# Patient Record
Sex: MALE | Race: WHITE | NOT HISPANIC OR LATINO | Employment: OTHER | URBAN - METROPOLITAN AREA
[De-identification: names, ages, dates, MRNs, and addresses within clinical notes are randomized per-mention and may not be internally consistent; named-entity substitution may affect disease eponyms.]

---

## 2021-06-28 ENCOUNTER — OFFICE VISIT (OUTPATIENT)
Dept: AUDIOLOGY | Facility: CLINIC | Age: 76
End: 2021-06-28
Payer: COMMERCIAL

## 2021-06-28 DIAGNOSIS — R42 DIZZINESS: Primary | ICD-10-CM

## 2021-06-28 PROCEDURE — 92567 TYMPANOMETRY: CPT | Performed by: AUDIOLOGIST

## 2021-06-28 PROCEDURE — 92540 BASIC VESTIBULAR EVALUATION: CPT | Performed by: AUDIOLOGIST

## 2021-06-28 PROCEDURE — 92537 CALORIC VSTBLR TEST W/REC: CPT | Performed by: AUDIOLOGIST

## 2021-06-28 NOTE — PROGRESS NOTES
Videonystagmography (VNG) Evaluation    Name:  Sol Chapa  :  1945  Age:  66 y o  Date of Evaluation: 21     History: Dizziness  Reason for visit: Sol Chapa is seen today at the request of Dr RIVERA [facility] for an evaluation of balance  Patient reports a history of dizziness that initially began at the end of   Patient reports all of a sudden he started to experience daily lightheadedness that lasts from morning to night  Patient also reports daily headaches; denies family history of migraines  Patient reports in 2021 he experienced true spinning vertigo where he followed up with ENT and completed a round of vestibular therapy with successful recovery  Patient denies any vertigo since  Patient also reports in 2021 having a fall where he landed on his face; unsure if he passed out  Patient denies any presyncope  Patient does admit to several heart problems for which he was hospitalized for 2x  Patient reports this lightheadedness occurs primarily when moving, getting out of the car, and in relation to some motions; denies it occurring when he is still, sitting, or lying  Patient denies any head injuries, viral infections, changes in hearing, tinnitus, aural fullness, or sound sensitivity  Tympanometry:   Right: Type A - normal middle ear pressure and compliance   Left: Type A - normal middle ear pressure and compliance    Oculomotor battery:    Gaze:          Right: Normal        Left: Normal         Up: Normal        Down: Normal      Saccades: Normal     Tracking: Normal     Optokinetic: Normal    Positioning/Positionals:     Patsi Sanna:    Right: Negative      Left: Negative        Positionals:     Sitting: Normal    Supine: Normal    Head Right: Normal    Head Left: Normal    Body Right: Normal    Body Left[de-identified] Normal      Calorics:     Bithermal Caloric Irrigation: Normal    Notes:  Normal central findings  Normal peripheral findings       Recommendations:  Continue medical work-up  Follow up with managing physician        Yenny Blum , CHILO-A  Clinical Audiologist

## 2022-05-23 ENCOUNTER — APPOINTMENT (OUTPATIENT)
Dept: RADIOLOGY | Facility: CLINIC | Age: 77
End: 2022-05-23
Payer: COMMERCIAL

## 2022-05-23 VITALS
SYSTOLIC BLOOD PRESSURE: 146 MMHG | DIASTOLIC BLOOD PRESSURE: 65 MMHG | WEIGHT: 74 LBS | HEIGHT: 67 IN | HEART RATE: 54 BPM | BODY MASS INDEX: 11.62 KG/M2

## 2022-05-23 DIAGNOSIS — M25.512 LEFT SHOULDER PAIN, UNSPECIFIED CHRONICITY: ICD-10-CM

## 2022-05-23 DIAGNOSIS — M24.812 INTERNAL DERANGEMENT OF LEFT SHOULDER: Primary | ICD-10-CM

## 2022-05-23 DIAGNOSIS — M19.012 PRIMARY OSTEOARTHRITIS OF LEFT SHOULDER: ICD-10-CM

## 2022-05-23 PROCEDURE — 73030 X-RAY EXAM OF SHOULDER: CPT

## 2022-05-23 PROCEDURE — 99204 OFFICE O/P NEW MOD 45 MIN: CPT | Performed by: ORTHOPAEDIC SURGERY

## 2022-05-23 RX ORDER — PANTOPRAZOLE SODIUM 40 MG/1
TABLET, DELAYED RELEASE ORAL
COMMUNITY
Start: 2022-04-28

## 2022-05-23 RX ORDER — METOPROLOL SUCCINATE 25 MG/1
TABLET, EXTENDED RELEASE ORAL
COMMUNITY
Start: 2022-03-10

## 2022-05-23 RX ORDER — ATORVASTATIN CALCIUM 40 MG/1
TABLET, FILM COATED ORAL
COMMUNITY
Start: 2022-04-28

## 2022-05-23 RX ORDER — LATANOPROST 50 UG/ML
SOLUTION/ DROPS OPHTHALMIC
COMMUNITY
Start: 2022-03-17

## 2022-05-23 RX ORDER — AMLODIPINE BESYLATE 5 MG/1
10 TABLET ORAL
COMMUNITY
Start: 2022-04-28

## 2022-05-23 RX ORDER — ACETAMINOPHEN AND CODEINE PHOSPHATE 300; 60 MG/1; MG/1
TABLET ORAL AS NEEDED
COMMUNITY
Start: 2022-04-05

## 2022-05-23 RX ORDER — AZELASTINE HYDROCHLORIDE 137 UG/1
SPRAY, METERED NASAL
COMMUNITY
Start: 2022-05-17

## 2022-05-23 RX ORDER — APIXABAN 5 MG/1
5 TABLET, FILM COATED ORAL 2 TIMES DAILY
COMMUNITY
Start: 2022-04-28

## 2022-05-23 RX ORDER — OXYBUTYNIN CHLORIDE 10 MG/1
10 TABLET, EXTENDED RELEASE ORAL 2 TIMES DAILY
COMMUNITY
Start: 2022-03-06

## 2022-05-23 NOTE — PROGRESS NOTES
Assessment/Plan:  1  Internal derangement of left shoulder  MRI shoulder left wo contrast   2  Primary osteoarthritis of left shoulder     3  Left shoulder pain, unspecified chronicity  XR shoulder 2+ vw left       Scribe Attestation    I,:  Clover Underwood am acting as a scribe while in the presence of the attending physician :       I,:  Kojo Booht MD personally performed the services described in this documentation    as scribed in my presence :           Upon evaluation and review of the left shoulder xrays taken today, Kiara Downs is presenting with signs and symptoms of severe left shoulder glenohumeral osteoarthritis  I discussed with Kiara Downs that at this time I would like to order an MRI of the left shoulder to question rotator cuff tear arthropathy  I discussed with Kiara Downs that depending on the results of the MRI, he could be a candidate for operative intervention in the form of a left shoulder reverse total shoulder arthroplasty  I discussed with Kiara Downs that due to his continue symptoms after treatment with his steroid injections and physical therapy, I believe that he should consider a total shoulder arthroplasty  Kiara Downs was agreeable to this and I provided him with an order for a left shoulder MRI today  I will follow up with Kiara Downs again upon completion of the left shoulder MRI for repeat evaluation, MRI review, and discussion concerning operative intervention  Subjective:   Rachel Menendez is a 64 y o  male who presents to the office today for initial evaluation of his left shoulder  Kiara Downs is referred to me today by his PCP, Dr Diallo Downs states that he has been experiencing pain about his left shoulder for several years  He has been previously treated concerning severe left glenohumeral osteoarthritis with about 4 steroid injections and formal physical therapy  He states that both injections and physical therapy have not improved his condition      He states that his left shoulder function has continued to decrease over time and he has limitations with reaching overhead  Kendal Larsen states that he has difficulty with sleeping at night due to his pain and often is woken up by his pain  He denies and distal paresthesias about the left upper extremity  Kendal Larsen does not recall any previous injury to his left shoulder or any previous surgeries about his left shoulder  Review of Systems   Constitutional: Negative for chills and fever  HENT: Negative for ear pain and sore throat  Eyes: Negative for pain and visual disturbance  Respiratory: Negative for cough and shortness of breath  Cardiovascular: Negative for chest pain and palpitations  Gastrointestinal: Negative for abdominal pain and vomiting  Genitourinary: Negative for dysuria and hematuria  Musculoskeletal: Negative for back pain  Skin: Negative for color change and rash  Neurological: Negative for seizures and syncope  All other systems reviewed and are negative          Past Medical History:   Diagnosis Date    Hypercholesterolemia     Hypertension        Past Surgical History:   Procedure Laterality Date    CORONARY STENT PLACEMENT      x 2    SPINAL FUSION      L2, L3 and L4    SPINAL STIMULATOR PLACEMENT  09/2021       Family History   Problem Relation Age of Onset    No Known Problems Mother        Social History     Occupational History    Not on file   Tobacco Use    Smoking status: Former Smoker     Types: Cigarettes    Smokeless tobacco: Never Used   Substance and Sexual Activity    Alcohol use: Not Currently    Drug use: Yes     Types: Marijuana     Comment: Medical Marijuana    Sexual activity: Not on file         Current Outpatient Medications:     acetaminophen-codeine (TYLENOL with CODEINE #4) 300-60 MG per tablet, as needed, Disp: , Rfl:     amLODIPine (NORVASC) 5 mg tablet, , Disp: , Rfl:     atorvastatin (LIPITOR) 40 mg tablet, , Disp: , Rfl:     Azelastine HCl 137 MCG/SPRAY SOLN, , Disp: , Rfl:     Eliquis 5 MG, 5 mg 2 (two) times a day, Disp: , Rfl:     latanoprost (XALATAN) 0 005 % ophthalmic solution, , Disp: , Rfl:     metoprolol succinate (TOPROL-XL) 25 mg 24 hr tablet, , Disp: , Rfl:     oxybutynin (DITROPAN-XL) 10 MG 24 hr tablet, 10 mg  in the morning and 10 mg before bedtime  , Disp: , Rfl:     pantoprazole (PROTONIX) 40 mg tablet, , Disp: , Rfl:     No Known Allergies    Objective:  Vitals:    05/23/22 1045   BP: 146/65   Pulse: (!) 54       Left Shoulder Exam     Range of Motion   Active abduction: 90   External rotation: 0   Forward flexion: 90   Internal rotation 90 degrees: 0     Muscle Strength   Left shoulder normal muscle strength: Abduction: 4-/5  Internal rotation: 5/5   External rotation: 5/5     Tests   Impingement: positive    Other   Erythema: absent  Scars: absent  Sensation: normal     Comments:  Severe crepitus with motion  Physical Exam  HENT:      Head: Normocephalic and atraumatic  Eyes:      General:         Right eye: No discharge  Left eye: No discharge  Extraocular Movements: Extraocular movements intact  Conjunctiva/sclera: Conjunctivae normal    Cardiovascular:      Rate and Rhythm: Normal rate  Pulmonary:      Effort: Pulmonary effort is normal  No respiratory distress  Musculoskeletal:      Cervical back: Normal range of motion and neck supple  Skin:     General: Skin is warm and dry  Neurological:      Mental Status: He is alert and oriented to person, place, and time  Psychiatric:         Mood and Affect: Mood normal          Behavior: Behavior normal          I have personally reviewed pertinent films in PACS and my interpretation is as follows:    Review of the left shoulder xrays taken today demonstrate severe glenohumeral osteoarthritis evidenced by joint space narrowing and osteophyte formation  There is also a superiorly migrated humerus suggestive of rotator cuff arthropathy

## 2022-05-27 DIAGNOSIS — R91.8 MASS OF LEFT LUNG: ICD-10-CM

## 2022-05-27 DIAGNOSIS — R91.1 LUNG NODULE SEEN ON IMAGING STUDY: Primary | ICD-10-CM

## 2022-05-27 DIAGNOSIS — J98.4 CAVITATING MASS IN RIGHT LOWER LUNG LOBE: Primary | ICD-10-CM

## 2022-05-27 NOTE — RESULT ENCOUNTER NOTE
I did leave a voicemail message for Hai asking him to call us back  He did have a questionable finding in the left lung field on his shoulder x-ray  The radiologist suggests that we have him receive a CT of his chest to further characterize the focal density seen in the left lung apex  I asked him to call us back here in the office    I will put in an order for the CT of the chest

## 2022-05-28 NOTE — TELEPHONE ENCOUNTER
Patient sees Dr Iam Gaspar  Patient is calling in stating that he was left a message from the Dr that something came up on the Xray and the Dr ordered a CT scan for him to have done  The patient is asking for the Dr to contact him back to discuss further          Call back# [phone]

## 2022-05-31 ENCOUNTER — TELEPHONE (OUTPATIENT)
Dept: OBGYN CLINIC | Facility: CLINIC | Age: 77
End: 2022-05-31

## 2022-05-31 NOTE — TELEPHONE ENCOUNTER
----- Message from Radha Colvin MD sent at 5/27/2022  5:27 PM EDT -----  I did leave a voicemail message for Maribel Graf asking him to call us back  He did have a questionable finding in the left lung field on his shoulder x-ray  The radiologist suggests that we have him receive a CT of his chest to further characterize the focal density seen in the left lung apex  I asked him to call us back here in the office    I will put in an order for the CT of the chest

## 2022-05-31 NOTE — TELEPHONE ENCOUNTER
Patient called, he was left a voicemail regarding his xray and stated he was on the phone with you, but it was disconnected and would like a call back    # [phone]

## 2022-05-31 NOTE — TELEPHONE ENCOUNTER
CT Scan    Montserrat Wilson routed conversation to Tedi Prader, MD; Priscilla Dyer, RN; Andre Arechiga Clinical 1 hour ago (7:06 AM)     Anastacia Poole 3 days ago     [facility]       Patient sees Dr Lena Nichols  Patient is calling in stating that he was left a message from the Dr that something came up on the Xray and the Dr ordered a CT scan for him to have done  The patient is asking for the Dr to contact him back to discuss further             Call back# [phone]         Documentation

## 2022-05-31 NOTE — TELEPHONE ENCOUNTER
I did call him and left a detailed message  Can we please help him schedule the CT scan of the chest which I ordered as a follow-up to the chest x-ray which demonstrated an abnormality in the lung field adjacent to the shoulder     Thank you

## 2022-06-02 ENCOUNTER — HOSPITAL ENCOUNTER (OUTPATIENT)
Dept: RADIOLOGY | Facility: HOSPITAL | Age: 77
Discharge: HOME | End: 2022-06-02
Attending: ORTHOPAEDIC SURGERY
Payer: COMMERCIAL

## 2022-06-02 DIAGNOSIS — R91.1 LUNG NODULE SEEN ON IMAGING STUDY: ICD-10-CM

## 2022-06-02 PROCEDURE — 71250 CT THORAX DX C-: CPT

## 2022-06-02 PROCEDURE — G1004 CDSM NDSC: HCPCS

## 2022-06-07 ENCOUNTER — TELEPHONE (OUTPATIENT)
Dept: OBGYN CLINIC | Facility: CLINIC | Age: 77
End: 2022-06-07

## 2022-06-07 NOTE — RESULT ENCOUNTER NOTE
I called Isatu Elias to let him know that his CT of his chest was normal in the left lung field  There was a small, incidental finding of a lung nodule in the right lung field measuring 2 mm  He is not a smoker  He is not high risk, thus, another CT in one year is not warranted  He was appreciative of the call and will f/u with me after his shoulder MRI

## 2022-06-07 NOTE — TELEPHONE ENCOUNTER
Patient called asking for the results of his CT on 6/02  Zaina Johnsons it is okay to leave message as he is going into an operation today     Note that the note on the CT reads as following:  Abnormal finding in the left lung field on shoulder x-ray   Requested by the radiologist ; Abnormal finding in the left lung field on shoulder x-ray   Requested by the radiologist    Dx: Lung nodule seen on imaging study [R91 1 (ICD-10-CM)]

## 2022-06-09 ENCOUNTER — TELEPHONE (OUTPATIENT)
Dept: OBGYN CLINIC | Facility: CLINIC | Age: 77
End: 2022-06-09

## 2022-06-09 NOTE — TELEPHONE ENCOUNTER
----- Message from Maurice Vega MD sent at 6/7/2022 12:44 PM EDT -----  I called Ryley Milner to let him know that his CT of his chest was normal in the left lung field  There was a small, incidental finding of a lung nodule in the right lung field measuring 2 mm  He is not a smoker  He is not high risk, thus, another CT in one year is not warranted  He was appreciative of the call and will f/u with me after his shoulder MRI

## 2022-06-09 NOTE — TELEPHONE ENCOUNTER
Patient MRI scheduled for 6/13/2022 Dr Francisco Javier Braga is very booked out so I wanted to get him in so he did not have to wait longer  I got patient in on 6/22/22 to go over MRI

## 2022-06-13 ENCOUNTER — HOSPITAL ENCOUNTER (OUTPATIENT)
Dept: RADIOLOGY | Facility: HOSPITAL | Age: 77
Discharge: HOME | End: 2022-06-13
Attending: ORTHOPAEDIC SURGERY
Payer: COMMERCIAL

## 2022-06-13 ENCOUNTER — HOSPITAL ENCOUNTER (OUTPATIENT)
Dept: RADIOLOGY | Facility: HOSPITAL | Age: 77
Discharge: HOME | End: 2022-06-13

## 2022-06-13 DIAGNOSIS — M24.812 INTERNAL DERANGEMENT OF LEFT SHOULDER: ICD-10-CM

## 2022-06-13 DIAGNOSIS — M79.5 FOREIGN BODY (FB) IN SOFT TISSUE: ICD-10-CM

## 2022-06-13 PROCEDURE — G1004 CDSM NDSC: HCPCS

## 2022-06-13 PROCEDURE — 73221 MRI JOINT UPR EXTREM W/O DYE: CPT

## 2022-06-22 ENCOUNTER — OFFICE VISIT (OUTPATIENT)
Dept: OBGYN CLINIC | Facility: CLINIC | Age: 77
End: 2022-06-22
Payer: COMMERCIAL

## 2022-06-22 VITALS
WEIGHT: 171.4 LBS | DIASTOLIC BLOOD PRESSURE: 67 MMHG | HEART RATE: 60 BPM | BODY MASS INDEX: 26.9 KG/M2 | HEIGHT: 67 IN | SYSTOLIC BLOOD PRESSURE: 175 MMHG

## 2022-06-22 DIAGNOSIS — Z01.812 PRE-OPERATIVE LABORATORY EXAMINATION: ICD-10-CM

## 2022-06-22 DIAGNOSIS — M19.012 PRIMARY OSTEOARTHRITIS OF LEFT SHOULDER: Primary | ICD-10-CM

## 2022-06-22 PROCEDURE — 99215 OFFICE O/P EST HI 40 MIN: CPT | Performed by: ORTHOPAEDIC SURGERY

## 2022-06-22 NOTE — PROGRESS NOTES
Assessment/Plan:  1  Primary osteoarthritis of left shoulder  CT upper extremity wo contrast left    Ambulatory Referral to Physical Therapy    Sling    Case request operating room: ARTHROPLASTY SHOULDER    Basic metabolic panel    CBC and differential    Type and screen    APTT    Protime-INR    Ambulatory referral to [facility]    Case request operating room: ARTHROPLASTY SHOULDER    MRSA culture   2  Pre-operative laboratory examination  Case request operating room: ARTHROPLASTY SHOULDER    Basic metabolic panel    CBC and differential    Type and screen    APTT    Protime-INR    Ambulatory referral to [facility]    Case request operating room: ARTHROPLASTY SHOULDER    MRSA culture       Scribe Attestation    I,:  Yazmin Whitten am acting as a scribe while in the presence of the attending physician :       I,:  Kita Vides MD personally performed the services described in this documentation    as scribed in my presence :         Adriana Vinson upon examination and review the MRI of the left shoulder does demonstrate intact rotator cuff  He does have severe glenohumeral joint osteoarthritis with large osteophytes inferiorly  This does coincide with his clinical exam with limitations in range of motion in all planes as well as significant crepitus  I did discuss with him today that he would be a candidate for a left standard total shoulder arthroplasty  I did discuss the procedure with him at length as well as the risks including but not limited to bleeding, infection, nerve injury resulting weakness, numbness, pain, stiffness, fracture, worsening of symptoms, dislocation, prosthesis rejection, blood clot, failure of surgery and need for further surgery  He understands that this is a major surgery  Adriana Vinson verbalize understanding and was amenable to the treatment plan presented to him today  He did provide signed consent for a left standard total shoulder arthroplasty    He will be fitted with a postoperative sling today by my DME fitter  Proper donning and doffing will be also instructed to him  He will meet with my surgical scheduler to set up a date and time have the surgery completed  I will see him back on date of his surgery  Subjective:   Katelin Calix is a 64 y o  male who presents to the office today for follow-up evaluation of his left shoulder  He has been experiencing activity related pain for many years to her shoulder  He does also have limitations in range of motion planes secondary a pain and weakness  He does also experience significant crepitus with overhead and reaching activities  He describes his pain as an intermittent and moderate sometimes click better while at rest   However, does have difficulty sleeping on his shoulder at night  Today he denies any distal paresthesias  He did have an MRI of his left shoulder completed to be reviewed today  Review of Systems   Constitutional: Negative for chills, fever and unexpected weight change  HENT: Negative for hearing loss, nosebleeds and sore throat  Eyes: Negative for pain, redness and visual disturbance  Respiratory: Negative for cough, shortness of breath and wheezing  Cardiovascular: Negative for chest pain, palpitations and leg swelling  Gastrointestinal: Negative for abdominal pain, nausea and vomiting  Endocrine: Negative for polyphagia and polyuria  Genitourinary: Negative for dysuria and hematuria  Musculoskeletal: Positive for arthralgias and myalgias  See HPI   Skin: Negative for rash and wound  Neurological: Negative for dizziness, numbness and headaches  Psychiatric/Behavioral: Negative for decreased concentration and suicidal ideas  The patient is not nervous/anxious            Past Medical History:   Diagnosis Date    Hypercholesterolemia     Hypertension        Past Surgical History:   Procedure Laterality Date    CORONARY STENT PLACEMENT      x 2    SPINAL FUSION L2, L3 and L4    SPINAL STIMULATOR PLACEMENT  09/2021       Family History   Problem Relation Age of Onset    No Known Problems Mother        Social History     Occupational History    Not on file   Tobacco Use    Smoking status: Former Smoker     Types: Cigarettes    Smokeless tobacco: Never Used   Substance and Sexual Activity    Alcohol use: Not Currently    Drug use: Yes     Types: Marijuana     Comment: Medical Marijuana    Sexual activity: Not on file         Current Outpatient Medications:     amLODIPine (NORVASC) 5 mg tablet, 10 mg 10mg once daily, Disp: , Rfl:     acetaminophen-codeine (TYLENOL with CODEINE #4) 300-60 MG per tablet, as needed, Disp: , Rfl:     atorvastatin (LIPITOR) 40 mg tablet, , Disp: , Rfl:     Azelastine HCl 137 MCG/SPRAY SOLN, , Disp: , Rfl:     Eliquis 5 MG, 5 mg 2 (two) times a day, Disp: , Rfl:     latanoprost (XALATAN) 0 005 % ophthalmic solution, , Disp: , Rfl:     metoprolol succinate (TOPROL-XL) 25 mg 24 hr tablet, , Disp: , Rfl:     oxybutynin (DITROPAN-XL) 10 MG 24 hr tablet, 10 mg  in the morning and 10 mg before bedtime  , Disp: , Rfl:     pantoprazole (PROTONIX) 40 mg tablet, , Disp: , Rfl:     No Known Allergies    Objective:  Vitals:    06/22/22 0923   BP: (!) 175/67   Pulse: 60       Left Shoulder Exam     Tenderness   Left shoulder tenderness location: anterior  Range of Motion   Active abduction: 90   External rotation: 10   Internal rotation 90 degrees: 20     Muscle Strength   Abduction: 4/5   Internal rotation: 5/5   External rotation: 5/5             Physical Exam  Vitals reviewed  HENT:      Head: Normocephalic and atraumatic  Eyes:      General:         Right eye: No discharge  Left eye: No discharge  Conjunctiva/sclera: Conjunctivae normal       Pupils: Pupils are equal, round, and reactive to light  Cardiovascular:      Rate and Rhythm: Normal rate  Heart sounds: Normal heart sounds     Pulmonary:      Effort: Pulmonary effort is normal  No respiratory distress  Breath sounds: Normal breath sounds  Musculoskeletal:      Cervical back: Normal range of motion and neck supple  Comments: As noted in HPI   Skin:     General: Skin is warm and dry  Neurological:      Mental Status: He is alert and oriented to person, place, and time  I have personally reviewed pertinent films in PACS and my interpretation is as follows:    MRI of the left shoulder demonstrates an intact rotator cuff with mild fatty atrophy of supraspinatus  Severe glenohumeral joint osteoarthritis is demonstrated

## 2022-07-01 ENCOUNTER — HOSPITAL ENCOUNTER (OUTPATIENT)
Dept: RADIOLOGY | Facility: HOSPITAL | Age: 77
Discharge: HOME | End: 2022-07-01
Attending: ORTHOPAEDIC SURGERY
Payer: COMMERCIAL

## 2022-07-01 ENCOUNTER — APPOINTMENT (OUTPATIENT)
Dept: LAB | Facility: HOSPITAL | Age: 77
End: 2022-07-01
Payer: COMMERCIAL

## 2022-07-01 DIAGNOSIS — Z01.812 PRE-OPERATIVE LABORATORY EXAMINATION: ICD-10-CM

## 2022-07-01 DIAGNOSIS — M19.012 PRIMARY OSTEOARTHRITIS OF LEFT SHOULDER: ICD-10-CM

## 2022-07-01 LAB
ABO GROUP BLD: NORMAL
ANION GAP SERPL CALCULATED.3IONS-SCNC: 10 MMOL/L (ref 4–13)
APTT PPP: 34 SECONDS (ref 23–37)
ATRIAL RATE: 55 BPM
BASOPHILS # BLD AUTO: 0.04 THOUSANDS/ΜL (ref 0–0.1)
BASOPHILS NFR BLD AUTO: 1 % (ref 0–1)
BLD GP AB SCN SERPL QL: NEGATIVE
BUN SERPL-MCNC: 15 MG/DL (ref 5–25)
CALCIUM SERPL-MCNC: 9.7 MG/DL (ref 8.3–10.1)
CHLORIDE SERPL-SCNC: 103 MMOL/L (ref 100–108)
CO2 SERPL-SCNC: 27 MMOL/L (ref 21–32)
CREAT SERPL-MCNC: 0.74 MG/DL (ref 0.6–1.3)
EOSINOPHIL # BLD AUTO: 0.15 THOUSAND/ΜL (ref 0–0.61)
EOSINOPHIL NFR BLD AUTO: 2 % (ref 0–6)
ERYTHROCYTE [DISTWIDTH] IN BLOOD BY AUTOMATED COUNT: 14.4 % (ref 11.6–15.1)
GFR SERPL CREATININE-BSD FRML MDRD: 88 ML/MIN/1.73SQ M
GLUCOSE P FAST SERPL-MCNC: 94 MG/DL (ref 65–99)
HCT VFR BLD AUTO: 38.2 % (ref 36.5–49.3)
HGB BLD-MCNC: 12.3 G/DL (ref 12–17)
IMM GRANULOCYTES # BLD AUTO: 0.02 THOUSAND/UL (ref 0–0.2)
IMM GRANULOCYTES NFR BLD AUTO: 0 % (ref 0–2)
INR PPP: 1.14 (ref 0.84–1.19)
LYMPHOCYTES # BLD AUTO: 1.65 THOUSANDS/ΜL (ref 0.6–4.47)
LYMPHOCYTES NFR BLD AUTO: 22 % (ref 14–44)
MCH RBC QN AUTO: 29 PG (ref 26.8–34.3)
MCHC RBC AUTO-ENTMCNC: 32.2 G/DL (ref 31.4–37.4)
MCV RBC AUTO: 90 FL (ref 82–98)
MONOCYTES # BLD AUTO: 0.74 THOUSAND/ΜL (ref 0.17–1.22)
MONOCYTES NFR BLD AUTO: 10 % (ref 4–12)
NEUTROPHILS # BLD AUTO: 4.81 THOUSANDS/ΜL (ref 1.85–7.62)
NEUTS SEG NFR BLD AUTO: 65 % (ref 43–75)
NRBC BLD AUTO-RTO: 0 /100 WBCS
P AXIS: -17 DEGREES
PLATELET # BLD AUTO: 231 THOUSANDS/UL (ref 149–390)
PMV BLD AUTO: 10.4 FL (ref 8.9–12.7)
POTASSIUM SERPL-SCNC: 3.9 MMOL/L (ref 3.5–5.3)
PR INTERVAL: 198 MS
PROTHROMBIN TIME: 14.4 SECONDS (ref 11.6–14.5)
QRS AXIS: 94 DEGREES
QRSD INTERVAL: 92 MS
QT INTERVAL: 438 MS
QTC INTERVAL: 455 MS
RBC # BLD AUTO: 4.24 MILLION/UL (ref 3.88–5.62)
RH BLD: POSITIVE
SODIUM SERPL-SCNC: 140 MMOL/L (ref 136–145)
SPECIMEN EXPIRATION DATE: NORMAL
T WAVE AXIS: -3 DEGREES
VENTRICULAR RATE: 65 BPM
WBC # BLD AUTO: 7.41 THOUSAND/UL (ref 4.31–10.16)

## 2022-07-01 PROCEDURE — 85610 PROTHROMBIN TIME: CPT

## 2022-07-01 PROCEDURE — 86901 BLOOD TYPING SEROLOGIC RH(D): CPT

## 2022-07-01 PROCEDURE — 86900 BLOOD TYPING SEROLOGIC ABO: CPT

## 2022-07-01 PROCEDURE — 86850 RBC ANTIBODY SCREEN: CPT

## 2022-07-01 PROCEDURE — 73200 CT UPPER EXTREMITY W/O DYE: CPT

## 2022-07-01 PROCEDURE — 93010 ELECTROCARDIOGRAM REPORT: CPT | Performed by: INTERNAL MEDICINE

## 2022-07-01 PROCEDURE — 36415 COLL VENOUS BLD VENIPUNCTURE: CPT

## 2022-07-01 PROCEDURE — 85025 COMPLETE CBC W/AUTO DIFF WBC: CPT

## 2022-07-01 PROCEDURE — 85730 THROMBOPLASTIN TIME PARTIAL: CPT

## 2022-07-01 PROCEDURE — 87081 CULTURE SCREEN ONLY: CPT

## 2022-07-01 PROCEDURE — G1004 CDSM NDSC: HCPCS

## 2022-07-01 PROCEDURE — 80048 BASIC METABOLIC PNL TOTAL CA: CPT

## 2022-07-01 PROCEDURE — 93005 ELECTROCARDIOGRAM TRACING: CPT

## 2022-07-02 LAB — MRSA NOSE QL CULT: NORMAL

## 2022-07-12 ENCOUNTER — TELEPHONE (OUTPATIENT)
Dept: OBGYN CLINIC | Facility: CLINIC | Age: 77
End: 2022-07-12

## 2022-07-12 NOTE — TELEPHONE ENCOUNTER
Received call informing us of Intent to Deny left shoulder surgery (cpt 46313) Inpatient Stay due to insufficient documentation of supervised physical therapy  Stated a p2p can be completed by the doctor before 7/13 @ 1 pm CST  Contact# [phone] Opt 1  I've reached out to the p2p line asking if there is a way to fax additional documentation vs doing a p2p  I was provided a fax# [phone]  Was told to reference number [id number]  I have faxed over the home exercises that were provided by ([facility]) back in 2016 showing that he did perform exercises to no avail  I also reached out to Bhaskar to inquire if he has in fact done recent O/P PT (within the last year)  He has confirmed that he has not done any O/P PT for the shoulder, however, he has done PT for his lumbar spine

## 2022-07-12 NOTE — TELEPHONE ENCOUNTER
With the additional clinical faxed over regarding physical therapy exercises, Inpatient Surgery has been authorized  Patient has been notified

## 2022-08-11 RX ORDER — ASPIRIN 81 MG/1
81 TABLET ORAL DAILY
COMMUNITY

## 2022-08-11 RX ORDER — MULTIVIT-MINS NO.63/IRON/FOLIC 27 MG-1 MG
1 TABLET ORAL DAILY
COMMUNITY

## 2022-08-11 NOTE — PRE-PROCEDURE INSTRUCTIONS
My Surgical Experience    The following information was developed to assist you to prepare for your operation  What do I need to do before coming to the hospital?   Arrange for a responsible person to drive you to and from the hospital    Arrange care for your children at home  Children are not allowed in the recovery areas of the hospital   Plan to wear clothing that is easy to put on and take off  If you are having shoulder surgery, wear a shirt that buttons or zippers in the front  Bathing  o Shower the evening before and the morning of your surgery with an antibacterial soap  Please refer to the Pre Op Showering Instructions for Surgery Patients Sheet   o Remove nail polish and all body piercing jewelry  o Do not shave any body part for at least 24 hours before surgery-this includes face, arms, legs and upper body  Food  o Nothing to eat or drink after midnight the night before your surgery  This includes candy and chewing gum  o Exception: If your surgery is after 12:00pm (noon), you may have clear liquids such as 7-Up®, ginger ale, apple or cranberry juice, Jell-O®, water, or clear broth until 8:00 am  o Do not drink milk or juice with pulp on the morning before surgery  o Do not drink alcohol 24 hours before surgery  Medicine  o Follow instructions you received from your surgeon about which medicines you may take on the day of surgery  o If instructed to take medicine on the morning of surgery, take pills with just a small sip of water  Call your prescribing doctor for specific infroamtion on what to do if you take insulin    What should I bring to the hospital?    Bring:  Silas Nims or a walker, if you have them, for foot or knee surgery   A list of the daily medicines, vitamins, minerals, herbals and nutritional supplements you take   Include the dosages of medicines and the time you take them each day   Glasses, dentures or hearing aids   Minimal clothing; you will be wearing hospital sleepwear   Photo ID; required to verify your identity   If you have a Living Will or Power of , bring a copy of the documents   If you have an ostomy, bring an extra pouch and any supplies you use    Do not bring   Medicines or inhalers   Money, valuables or jewelry    What other information should I know about the day of surgery?  Notify your surgeons if you develop a cold, sore throat, cough, fever, rash or any other illness   Report to the Ambulatory Surgical/Same Day Surgery Unit   You will be instructed to stop at Registration only if you have not been pre-registered   Inform your  fi they do not stay that they will be asked by the staff to leave a phone number where they can be reached   Be available to be reached before surgery  In the event the operating room schedule changes, you may be asked to come in earlier or later than expected    *It is important to tell your doctor and others involved in your health care if you are taking or have been taking any non-prescription drugs, vitamins, minerals, herbals or other nutritional supplements  Any of these may interact with some food or medicines and cause a reaction      Pre-Surgery Instructions:   Medication Instructions    amLODIPine (NORVASC) 5 mg tablet Take day of surgery   aspirin (ECOTRIN LOW STRENGTH) 81 mg EC tablet Stop taking 5 days prior to surgery   atorvastatin (LIPITOR) 40 mg tablet Take night before surgery    Azelastine HCl 137 MCG/SPRAY SOLN Take day of surgery   latanoprost (XALATAN) 0 005 % ophthalmic solution Take night before surgery    metoprolol succinate (TOPROL-XL) 25 mg 24 hr tablet Take night before surgery    oxybutynin (DITROPAN-XL) 10 MG 24 hr tablet Take day of surgery   pantoprazole (PROTONIX) 40 mg tablet Take day of surgery   Prenatal Vit-Fe Fumarate-FA (M-Vit) tablet Hold day of surgery  Bring sling and c pap machine ; wear a loose top

## 2022-08-15 ENCOUNTER — TELEPHONE (OUTPATIENT)
Dept: OBGYN CLINIC | Facility: HOSPITAL | Age: 77
End: 2022-08-15

## 2022-08-15 NOTE — TELEPHONE ENCOUNTER
Patient would like to reserve the ice machine for after surgery? Patient can be reached at [phone] to pick it up

## 2022-08-15 NOTE — TELEPHONE ENCOUNTER
Spoke with Virgia Heimlich  He or his wife will be in between now and surgery to  in [place]  He was instructed to bring a CC for the payment

## 2022-08-17 ENCOUNTER — ANESTHESIA EVENT (OUTPATIENT)
Dept: PERIOP | Facility: HOSPITAL | Age: 77
DRG: 483 | End: 2022-08-17
Payer: COMMERCIAL

## 2022-08-18 ENCOUNTER — ANESTHESIA (OUTPATIENT)
Dept: PERIOP | Facility: HOSPITAL | Age: 77
DRG: 483 | End: 2022-08-18
Payer: COMMERCIAL

## 2022-08-18 ENCOUNTER — APPOINTMENT (OUTPATIENT)
Dept: RADIOLOGY | Facility: HOSPITAL | Age: 77
DRG: 483 | End: 2022-08-18
Payer: COMMERCIAL

## 2022-08-18 ENCOUNTER — HOSPITAL ENCOUNTER (INPATIENT)
Facility: HOSPITAL | Age: 77
LOS: 1 days | Discharge: HOME | DRG: 483 | End: 2022-08-19
Attending: ORTHOPAEDIC SURGERY | Admitting: ORTHOPAEDIC SURGERY
Payer: COMMERCIAL

## 2022-08-18 DIAGNOSIS — M19.012 PRIMARY OSTEOARTHRITIS OF LEFT SHOULDER: Primary | Chronic | ICD-10-CM

## 2022-08-18 DIAGNOSIS — Z96.612 STATUS POST REPLACEMENT OF LEFT SHOULDER JOINT: ICD-10-CM

## 2022-08-18 PROCEDURE — 80053 COMPREHEN METABOLIC PANEL: CPT | Performed by: PHYSICIAN ASSISTANT

## 2022-08-18 PROCEDURE — C1776 JOINT DEVICE (IMPLANTABLE): HCPCS | Performed by: ORTHOPAEDIC SURGERY

## 2022-08-18 PROCEDURE — 0LS40ZZ REPOSITION LEFT UPPER ARM TENDON, OPEN APPROACH: ICD-10-PCS | Performed by: ORTHOPAEDIC SURGERY

## 2022-08-18 PROCEDURE — 99024 POSTOP FOLLOW-UP VISIT: CPT | Performed by: PHYSICIAN ASSISTANT

## 2022-08-18 PROCEDURE — C9290 INJ, BUPIVACAINE LIPOSOME: HCPCS | Performed by: ANESTHESIOLOGY

## 2022-08-18 PROCEDURE — 23472 RECONSTRUCT SHOULDER JOINT: CPT | Performed by: ORTHOPAEDIC SURGERY

## 2022-08-18 PROCEDURE — C1769 GUIDE WIRE: HCPCS | Performed by: ORTHOPAEDIC SURGERY

## 2022-08-18 PROCEDURE — 23472 RECONSTRUCT SHOULDER JOINT: CPT | Performed by: PHYSICIAN ASSISTANT

## 2022-08-18 PROCEDURE — 73020 X-RAY EXAM OF SHOULDER: CPT

## 2022-08-18 PROCEDURE — C1713 ANCHOR/SCREW BN/BN,TIS/BN: HCPCS | Performed by: ORTHOPAEDIC SURGERY

## 2022-08-18 PROCEDURE — 0RRK0JZ REPLACEMENT OF LEFT SHOULDER JOINT WITH SYNTHETIC SUBSTITUTE, OPEN APPROACH: ICD-10-PCS | Performed by: ORTHOPAEDIC SURGERY

## 2022-08-18 DEVICE — GLOBAL UNITE ANATOMIC PROXIMAL BODY 135 DEGREE SIZE 14 POROCOAT
Type: IMPLANTABLE DEVICE | Site: SHOULDER | Status: FUNCTIONAL
Brand: GLOBAL UNITE

## 2022-08-18 DEVICE — GLOBAL ANCHOR PEG GLENOID PREMIERON X-LINKED PE SIZE 52MM
Type: IMPLANTABLE DEVICE | Status: FUNCTIONAL
Brand: GLOBAL

## 2022-08-18 DEVICE — GLOBAL UNITE POROCOAT STANDARD STEM SIZE 14 129MM
Type: IMPLANTABLE DEVICE | Site: SHOULDER | Status: FUNCTIONAL
Brand: GLOBAL UNITE

## 2022-08-18 DEVICE — PALACOS® R IS A FAST-CURING, RADIOPAQUE, POLY(METHYL METHACRYLATE)-BASED BONE CEMENT.PALACOS ® R CONTAINS THE X-RAY CONTRAST MEDIUM ZIRCONIUM DIOXIDE. TO IMPROVE VISIBILITY IN THE SURGICAL FIELD PALACOS ® R HAS BEEN COLOURED WITH CHLOROPHYLL (E141). THE BONE CEMENT IS PREPARED DIRECTLY BEFORE USE BY MIXING A POLYMER POWDER COMPONENT WITH A LIQUID MONOMER COMPONENT. A DUCTILE DOUGH FORMS WHICH CURES WITHIN A FEW MINUTES.
Type: IMPLANTABLE DEVICE | Site: SHOULDER | Status: FUNCTIONAL
Brand: PALACOS®

## 2022-08-18 DEVICE — GLOBAL UNITE ECCENTRIC HUMERAL HEAD SIZE 52MM X 21MM
Type: IMPLANTABLE DEVICE | Site: SHOULDER | Status: FUNCTIONAL
Brand: GLOBAL UNITE

## 2022-08-18 RX ORDER — METOCLOPRAMIDE HYDROCHLORIDE 5 MG/ML
10 INJECTION INTRAMUSCULAR; INTRAVENOUS ONCE AS NEEDED
Status: DISCONTINUED | OUTPATIENT
Start: 2022-08-18 | End: 2022-08-18 | Stop reason: HOSPADM

## 2022-08-18 RX ORDER — LIDOCAINE HYDROCHLORIDE 10 MG/ML
INJECTION, SOLUTION EPIDURAL; INFILTRATION; INTRACAUDAL; PERINEURAL AS NEEDED
Status: DISCONTINUED | OUTPATIENT
Start: 2022-08-18 | End: 2022-08-18

## 2022-08-18 RX ORDER — PANTOPRAZOLE SODIUM 40 MG/1
40 TABLET, DELAYED RELEASE ORAL
Status: DISCONTINUED | OUTPATIENT
Start: 2022-08-18 | End: 2022-08-19 | Stop reason: HOSPADM

## 2022-08-18 RX ORDER — MIDAZOLAM HYDROCHLORIDE 2 MG/2ML
INJECTION, SOLUTION INTRAMUSCULAR; INTRAVENOUS AS NEEDED
Status: DISCONTINUED | OUTPATIENT
Start: 2022-08-18 | End: 2022-08-18

## 2022-08-18 RX ORDER — CEFAZOLIN SODIUM 2 G/50ML
2000 SOLUTION INTRAVENOUS EVERY 8 HOURS
Status: COMPLETED | OUTPATIENT
Start: 2022-08-18 | End: 2022-08-19

## 2022-08-18 RX ORDER — FENTANYL CITRATE/PF 50 MCG/ML
50 SYRINGE (ML) INJECTION
Status: DISCONTINUED | OUTPATIENT
Start: 2022-08-18 | End: 2022-08-18 | Stop reason: HOSPADM

## 2022-08-18 RX ORDER — OXYCODONE HYDROCHLORIDE 5 MG/1
5 TABLET ORAL EVERY 4 HOURS PRN
Status: DISCONTINUED | OUTPATIENT
Start: 2022-08-18 | End: 2022-08-19 | Stop reason: HOSPADM

## 2022-08-18 RX ORDER — HYDROMORPHONE HCL/PF 1 MG/ML
0.5 SYRINGE (ML) INJECTION
Status: DISCONTINUED | OUTPATIENT
Start: 2022-08-18 | End: 2022-08-18 | Stop reason: HOSPADM

## 2022-08-18 RX ORDER — PROPOFOL 10 MG/ML
INJECTION, EMULSION INTRAVENOUS AS NEEDED
Status: DISCONTINUED | OUTPATIENT
Start: 2022-08-18 | End: 2022-08-18

## 2022-08-18 RX ORDER — ONDANSETRON 2 MG/ML
INJECTION INTRAMUSCULAR; INTRAVENOUS AS NEEDED
Status: DISCONTINUED | OUTPATIENT
Start: 2022-08-18 | End: 2022-08-18

## 2022-08-18 RX ORDER — MAGNESIUM HYDROXIDE 1200 MG/15ML
LIQUID ORAL AS NEEDED
Status: DISCONTINUED | OUTPATIENT
Start: 2022-08-18 | End: 2022-08-18 | Stop reason: HOSPADM

## 2022-08-18 RX ORDER — ONDANSETRON 2 MG/ML
4 INJECTION INTRAMUSCULAR; INTRAVENOUS ONCE AS NEEDED
Status: DISCONTINUED | OUTPATIENT
Start: 2022-08-18 | End: 2022-08-18 | Stop reason: HOSPADM

## 2022-08-18 RX ORDER — OXYBUTYNIN CHLORIDE 5 MG/1
10 TABLET, EXTENDED RELEASE ORAL 2 TIMES DAILY
Status: DISCONTINUED | OUTPATIENT
Start: 2022-08-18 | End: 2022-08-19 | Stop reason: HOSPADM

## 2022-08-18 RX ORDER — FENTANYL CITRATE 50 UG/ML
INJECTION, SOLUTION INTRAMUSCULAR; INTRAVENOUS
Status: COMPLETED | OUTPATIENT
Start: 2022-08-18 | End: 2022-08-18

## 2022-08-18 RX ORDER — AMLODIPINE BESYLATE 5 MG/1
5 TABLET ORAL DAILY
Status: DISCONTINUED | OUTPATIENT
Start: 2022-08-18 | End: 2022-08-19 | Stop reason: HOSPADM

## 2022-08-18 RX ORDER — FENTANYL CITRATE 50 UG/ML
INJECTION, SOLUTION INTRAMUSCULAR; INTRAVENOUS AS NEEDED
Status: DISCONTINUED | OUTPATIENT
Start: 2022-08-18 | End: 2022-08-18

## 2022-08-18 RX ORDER — ASPIRIN 81 MG/1
81 TABLET ORAL DAILY
Status: DISCONTINUED | OUTPATIENT
Start: 2022-08-19 | End: 2022-08-19 | Stop reason: HOSPADM

## 2022-08-18 RX ORDER — CEFAZOLIN SODIUM 2 G/50ML
2000 SOLUTION INTRAVENOUS ONCE
Status: COMPLETED | OUTPATIENT
Start: 2022-08-18 | End: 2022-08-18

## 2022-08-18 RX ORDER — ACETAMINOPHEN 325 MG/1
650 TABLET ORAL EVERY 6 HOURS PRN
Status: DISCONTINUED | OUTPATIENT
Start: 2022-08-18 | End: 2022-08-19 | Stop reason: HOSPADM

## 2022-08-18 RX ORDER — METOPROLOL SUCCINATE 25 MG/1
12.5 TABLET, EXTENDED RELEASE ORAL DAILY
Status: DISCONTINUED | OUTPATIENT
Start: 2022-08-18 | End: 2022-08-19 | Stop reason: HOSPADM

## 2022-08-18 RX ORDER — LATANOPROST 50 UG/ML
1 SOLUTION/ DROPS OPHTHALMIC
Status: DISCONTINUED | OUTPATIENT
Start: 2022-08-18 | End: 2022-08-19 | Stop reason: HOSPADM

## 2022-08-18 RX ORDER — SODIUM CHLORIDE, SODIUM LACTATE, POTASSIUM CHLORIDE, CALCIUM CHLORIDE 600; 310; 30; 20 MG/100ML; MG/100ML; MG/100ML; MG/100ML
50 INJECTION, SOLUTION INTRAVENOUS CONTINUOUS
Status: DISPENSED | OUTPATIENT
Start: 2022-08-18 | End: 2022-08-18

## 2022-08-18 RX ORDER — SODIUM CHLORIDE, SODIUM LACTATE, POTASSIUM CHLORIDE, CALCIUM CHLORIDE 600; 310; 30; 20 MG/100ML; MG/100ML; MG/100ML; MG/100ML
125 INJECTION, SOLUTION INTRAVENOUS CONTINUOUS
Status: DISCONTINUED | OUTPATIENT
Start: 2022-08-18 | End: 2022-08-18

## 2022-08-18 RX ORDER — ATORVASTATIN CALCIUM 40 MG/1
40 TABLET, FILM COATED ORAL
Status: DISCONTINUED | OUTPATIENT
Start: 2022-08-18 | End: 2022-08-19 | Stop reason: HOSPADM

## 2022-08-18 RX ORDER — BUPIVACAINE HYDROCHLORIDE 2.5 MG/ML
INJECTION, SOLUTION EPIDURAL; INFILTRATION; INTRACAUDAL
Status: COMPLETED | OUTPATIENT
Start: 2022-08-18 | End: 2022-08-18

## 2022-08-18 RX ORDER — DEXAMETHASONE SODIUM PHOSPHATE 4 MG/ML
INJECTION, SOLUTION INTRA-ARTICULAR; INTRALESIONAL; INTRAMUSCULAR; INTRAVENOUS; SOFT TISSUE AS NEEDED
Status: DISCONTINUED | OUTPATIENT
Start: 2022-08-18 | End: 2022-08-18

## 2022-08-18 RX ORDER — MIDAZOLAM HYDROCHLORIDE 2 MG/2ML
INJECTION, SOLUTION INTRAMUSCULAR; INTRAVENOUS
Status: COMPLETED | OUTPATIENT
Start: 2022-08-18 | End: 2022-08-18

## 2022-08-18 RX ORDER — DOCUSATE SODIUM 100 MG/1
100 CAPSULE, LIQUID FILLED ORAL 2 TIMES DAILY
Status: DISCONTINUED | OUTPATIENT
Start: 2022-08-18 | End: 2022-08-19 | Stop reason: HOSPADM

## 2022-08-18 RX ORDER — ONDANSETRON 2 MG/ML
4 INJECTION INTRAMUSCULAR; INTRAVENOUS EVERY 6 HOURS PRN
Status: DISCONTINUED | OUTPATIENT
Start: 2022-08-18 | End: 2022-08-19 | Stop reason: HOSPADM

## 2022-08-18 RX ADMIN — DOCUSATE SODIUM 100 MG: 100 CAPSULE, LIQUID FILLED ORAL at 13:18

## 2022-08-18 RX ADMIN — SODIUM CHLORIDE, SODIUM LACTATE, POTASSIUM CHLORIDE, AND CALCIUM CHLORIDE: .6; .31; .03; .02 INJECTION, SOLUTION INTRAVENOUS at 09:19

## 2022-08-18 RX ADMIN — ONDANSETRON 4 MG: 2 INJECTION INTRAMUSCULAR; INTRAVENOUS at 09:34

## 2022-08-18 RX ADMIN — LATANOPROST 1 DROP: 50 SOLUTION OPHTHALMIC at 21:55

## 2022-08-18 RX ADMIN — AMLODIPINE BESYLATE 5 MG: 5 TABLET ORAL at 13:19

## 2022-08-18 RX ADMIN — BUPIVACAINE HYDROCHLORIDE 10 ML: 2.5 INJECTION, SOLUTION EPIDURAL; INFILTRATION; INTRACAUDAL at 09:43

## 2022-08-18 RX ADMIN — FENTANYL CITRATE 25 MCG: 50 INJECTION, SOLUTION INTRAMUSCULAR; INTRAVENOUS at 11:00

## 2022-08-18 RX ADMIN — CEFAZOLIN SODIUM 2000 MG: 2 SOLUTION INTRAVENOUS at 09:32

## 2022-08-18 RX ADMIN — MIDAZOLAM 1 MG: 1 INJECTION INTRAMUSCULAR; INTRAVENOUS at 09:44

## 2022-08-18 RX ADMIN — OXYBUTYNIN 10 MG: 5 TABLET, FILM COATED, EXTENDED RELEASE ORAL at 13:18

## 2022-08-18 RX ADMIN — SODIUM CHLORIDE, SODIUM LACTATE, POTASSIUM CHLORIDE, AND CALCIUM CHLORIDE 50 ML/HR: .6; .31; .03; .02 INJECTION, SOLUTION INTRAVENOUS at 13:20

## 2022-08-18 RX ADMIN — FENTANYL CITRATE 50 MCG: 50 INJECTION, SOLUTION INTRAMUSCULAR; INTRAVENOUS at 09:44

## 2022-08-18 RX ADMIN — METOPROLOL SUCCINATE 12.5 MG: 25 TABLET, EXTENDED RELEASE ORAL at 13:19

## 2022-08-18 RX ADMIN — DOCUSATE SODIUM 100 MG: 100 CAPSULE, LIQUID FILLED ORAL at 17:00

## 2022-08-18 RX ADMIN — ATORVASTATIN CALCIUM 40 MG: 40 TABLET, FILM COATED ORAL at 17:00

## 2022-08-18 RX ADMIN — PROPOFOL 150 MG: 10 INJECTION, EMULSION INTRAVENOUS at 09:34

## 2022-08-18 RX ADMIN — BUPIVACAINE 20 ML: 13.3 INJECTION, SUSPENSION, LIPOSOMAL INFILTRATION at 09:26

## 2022-08-18 RX ADMIN — DEXAMETHASONE SODIUM PHOSPHATE 8 MG: 4 INJECTION INTRA-ARTICULAR; INTRALESIONAL; INTRAMUSCULAR; INTRAVENOUS; SOFT TISSUE at 09:34

## 2022-08-18 RX ADMIN — LIDOCAINE HYDROCHLORIDE 50 MG: 10 INJECTION, SOLUTION EPIDURAL; INFILTRATION; INTRACAUDAL at 09:34

## 2022-08-18 RX ADMIN — FENTANYL CITRATE 25 MCG: 50 INJECTION, SOLUTION INTRAMUSCULAR; INTRAVENOUS at 10:05

## 2022-08-18 RX ADMIN — SODIUM CHLORIDE, SODIUM LACTATE, POTASSIUM CHLORIDE, AND CALCIUM CHLORIDE: .6; .31; .03; .02 INJECTION, SOLUTION INTRAVENOUS at 10:51

## 2022-08-18 RX ADMIN — MIDAZOLAM 1 MG: 1 INJECTION INTRAMUSCULAR; INTRAVENOUS at 09:30

## 2022-08-18 RX ADMIN — CEFAZOLIN SODIUM 2000 MG: 2 SOLUTION INTRAVENOUS at 16:59

## 2022-08-18 RX ADMIN — PANTOPRAZOLE SODIUM 40 MG: 40 TABLET, DELAYED RELEASE ORAL at 13:19

## 2022-08-18 NOTE — ANESTHESIA PROCEDURE NOTES
Peripheral Block    Patient location during procedure: pre-op  Start time: 8/18/2022 9:30 AM  Reason for block: procedure for pain, at surgeon's request and post-op pain management  Staffing  Performed: Anesthesiologist   Anesthesiologist: Yonatan Chowdhury MD  Preanesthetic Checklist  Completed: patient identified, IV checked, site marked, risks and benefits discussed, surgical consent, monitors and equipment checked, pre-op evaluation and timeout performed  Peripheral Block  Patient position: supine  Prep: ChloraPrep  Patient monitoring: heart rate, cardiac monitor, continuous pulse ox and frequent blood pressure checks  Block type: interscalene  Laterality: left  Injection technique: single-shot  Procedures: ultrasound guided, Ultrasound guidance required for the procedure to increase accuracy and safety of medication placement and decrease risk of complications   and nerve stimulator  Ultrasound permanent image savedbupivacaine (PF) (MARCAINE) 0 25 % 10 mL - Perineural   10 mL - 8/18/2022 9:43:00 AM  fentaNYL 50 mcg/mL - Intravenous   50 mcg - 8/18/2022 9:44:00 AM  midazolam (VERSED) 2 mg/2 mL - Intravenous   1 mg - 8/18/2022 9:44:00 AM (exparel 20 ml)  Needle  Needle type: Stimuplex   Needle gauge: 22 G  Needle length: 5 cm  Needle localization: ultrasound guidance and nerve stimulator  Needle insertion depth: 2 cm  Test dose: negative  Assessment  Injection assessment: incremental injection, local visualized surrounding nerve on ultrasound, negative aspiration for CSF, no paresthesia on injection and negative aspiration for heme  Paresthesia pain: none  Heart rate change: no  Slow fractionated injection: yes  Post-procedure:  site cleaned  patient tolerated the procedure well with no immediate complications

## 2022-08-18 NOTE — OP NOTE
OPERATIVE REPORT  PATIENT NAME: Divya Mann    :  1945  MRN: [id number]  Pt Location: [facility] OR ROOM 01    SURGERY DATE: 2022    Surgeon(s) and Role:     * Balta Eldridge MD - Primary     * Maggie Santizo PA-C - Assisting necessary for the procedure for assistance with retraction of vital structures well as assistance with exposure of the glenohumeral joint and assistance with preparation of the joint for implantation of the prosthesis as well assistance with implantation of the prosthesis and suture management for biceps tenodesis  Tammy DOWNEY  [facility] 2nd assistant    Preop Diagnosis:  Primary osteoarthritis of left shoulder [M19 012]  Pre-operative laboratory examination [Z01 812]    Post-Op Diagnosis Codes:     * Primary osteoarthritis of left shoulder [M19 012] and high-grade partial-thickness tear long head of biceps tendon     * Pre-operative laboratory examination [Z01 812]    Procedure:  Left total shoulder arthroplasty utilizing Depuy Global unite anatomic proximal body 135 degree size 14 Porocoat and Global unite Porocoat standard stem size 14 x 129 mm with an anchor peg glenoid cross-linked polyethylene size 52 mm cemented and a Global unite eccentric humeral head size 52 x 21 mm and long head of biceps tenodesis    Specimen(s):  * No specimens in log *    Estimated Blood Loss:   200 cc    Drains:  * No LDAs found *    Anesthesia Type:   General w/ Regional    Operative Indications:  Primary osteoarthritis of left shoulder [M19 012]  Pre-operative laboratory examination [Z01 812]  Jonelle Kocher is a 77-year-old male who has been suffering long-term with left shoulder severe pain and disability secondary to severe osteoarthritis  MRI demonstrated intact rotator cuff  He had failed non operative air measures and wished to undergo a left total shoulder arthroplasty  CT scan was done preoperatively for templating with a TruMatch technique    He understood the risks and benefits of total shoulder arthroplasty and wished to go ahead  The risks are inclusive of but not limited to infection, stiffness, nerve or blood vessel injury causing numbness pain and weakness, worsening of symptoms, failure to regain full strength and ability, failure to achieve anticipated results, recurrent tear, and need for further surgery  Operative Findings:  Left shoulder exam under anesthesia demonstrated forward flexion to 120° abduction to 70° external rotation is 0° internal rotation is 0°  Intra-articular findings demonstrated intact rotator cuff throughout however long head of biceps tendon did have significant tearing requiring a tenodesis  We also demonstrated large osteophytes and no articular cartilage remaining along the glenoid or humeral head  We did remove the osteophytes in standard fashion  We were able to achieve a very stable prosthesis at the end the operation with forward flexion to 160° abduction 150° external rotation to 90° internal rotation to 90°  Very stable Shuck and good fit was appreciated at the end of the operation  Complications:   None    Procedure and Technique:  Atilio Garcia was taken to the operating room and placed supine on the OR table  He was given preoperative IV antibiotics was preoperative regional anesthesia by attending anesthesiologist   General anesthesia was induced and he was brought comfortably safely into the semi beach chair position with all parts well padded and the head neutral   We then took the left shoulder through exam under anesthesia as described above  The left upper extremity was then prepped and draped in usual sterile fashion  Surgical time-out was taken  We utilized a deltopectoral approach for the operation with a 15 blade through skin  We did carefully that dissect down to the deltopectoral interval and took the cephalic vein laterally  We then dissected to the conjoined tendon and dissected that medially    We then dissected long head of biceps tendon found that there was a high-grade partial-thickness tear and thus tag that with an Ethibond suture and tenotomized it for later repair and tenodesis  We then exposed the glenohumeral joint with a tenotomy through the subscapularis tendon  We tagged the tendon with Ethibond suture for later repair  We did remove circumferential osteophytes as there were numerous utilizing an osteotome and mallet  We also demonstrated no articular cartilage remaining about the humeral head  We then began with a starter Reamer and reamed up to a size 14 for the humeral stem  We then placed the cutting guide set at 30° of retroversion and did make a humeral cut with a sagittal saw  We then placed a proximal humerus protector and exposed the glenoid  We removed the glenoid labrum and long head of biceps  We demonstrated no articular cartilage remaining along the glenoid and some significant retroversion which we knew from the preoperative CT scan  We thus chose a +3 mm offset for our pin guide to help correct some of this retroversion  We placed a pin in the center of the glenoid in standard fashion  We then utilized the power Reamer followed by the eccentric Reamer and reamed only the anterior half of the glenoid to be able to reduce some of the retroversion  We then drilled the center hole and then used the guide for drilling of the peripheral 3 holes  We were able to place a trial which fit very nicely as a size 52  We then removed the trial and irrigated thoroughly and then cemented in place with the 3 peripheral pegs the real size 52 mm glenoid  This sat very nicely  We then turned our attention back to the proximal humerus  We did broach with the 14 mm broach in 30° of retroversion  We then placed the trial prosthesis and trialed with an eccentric 52 mm x 18 humeral head followed by 52 mm x 21 mm humeral head which appeared to be a better fit    We then removed the trial prosthesis after range of motion as described above  It appeared quite stable  We irrigated the proximal humerus and then implanted the trial prosthesis with a Global unite standard stem and anatomic proximal body and placed that in 30° of retroversion and impacted in place very nicely  We then placed the real eccentric humeral head size 52 mm and impacted that nicely  We reduced the glenohumeral joint and found excellent fit and stability  Range of motion was excellent as described above  We then irrigated further and closed the subscapularis tenotomy with interrupted Ethibond suture  We then tenodesed the long head of biceps with interrupted Ethibond suture to adjacent soft tissue  We irrigated further and closed the deltopectoral interval with 0 Vicryl suture followed by 2-0 Vicryl and 4-0 Vicryl and skin glue  Dry, sterile dressings were applied with a sling  He tolerated procedure well and transferred to recovery room stable condition  He will be admitted the hospital and will be on the total shoulder arthroplasty rehabilitation protocol  He will have a postoperative x-ray in the recovery room     I was present for the entire procedure and A qualified resident physician was not available    Patient Disposition:  PACU       SIGNATURE: Ellis Gill MD  DATE: August 18, 2022  TIME: 11:35 AM

## 2022-08-18 NOTE — ANESTHESIA POSTPROCEDURE EVALUATION
Post-Op Assessment Note    CV Status:  Stable  Pain Score: 0    Pain management: adequate     Mental Status:  Alert and awake   Hydration Status:  Euvolemic   PONV Controlled:  Controlled   Airway Patency:  Patent      Post Op Vitals Reviewed: Yes      Staff: CRNA         No complications documented      BP   131/61   Temp   98   Pulse  51   Resp   16   SpO2   98

## 2022-08-18 NOTE — H&P
Assessment/Plan:  The patient would like to proceed with left total shoulder arthroplasty  We discussed the procedure and risks at length, including but not limited to, infection, bleeding, wound issues, nerve injury, blood clot, stiffness, failure of procedure, and need for additional surgery  We will see the patient back at the time of surgery  Subjective:   Harmony Jalloh is a 76 y o  male with left shoulder OA who notes ongoing pain about his shoulder despite non-operative treatment thus far  Review of Systems   Constitutional: Negative  Negative for chills and fever  HENT: Negative  Negative for ear pain and sore throat  Eyes: Negative  Negative for pain and redness  Respiratory: Negative  Negative for shortness of breath and wheezing  Cardiovascular: Negative for chest pain and palpitations  Gastrointestinal: Negative  Negative for abdominal pain and blood in stool  Endocrine: Negative  Negative for polydipsia and polyuria  Genitourinary: Negative  Negative for difficulty urinating and dysuria  Musculoskeletal:        As noted in HPI   Skin: Negative  Negative for pallor and rash  Neurological: Negative  Negative for dizziness and numbness  Hematological: Negative  Negative for adenopathy  Does not bruise/bleed easily  Psychiatric/Behavioral: Negative  Negative for confusion and suicidal ideas           Past Medical History:   Diagnosis Date    Coronary artery disease     CPAP (continuous positive airway pressure) dependence     Hypercholesterolemia     Hypertension     Sleep apnea        Past Surgical History:   Procedure Laterality Date    BACK SURGERY      CARDIAC LOOP RECORDER      CORONARY STENT PLACEMENT      x 2    SPINAL FUSION      L2, L3 and L4    SPINAL STIMULATOR PLACEMENT  09/2021       Family History   Problem Relation Age of Onset    No Known Problems Mother        Social History     Occupational History    Not on file   Tobacco Use    Smoking status: Former Smoker     Types: Cigarettes    Smokeless tobacco: Never Used   Substance and Sexual Activity    Alcohol use: Not Currently    Drug use: Yes     Types: Marijuana     Comment: Medical Marijuana    Sexual activity: Not on file         Current Facility-Administered Medications:     bupivacaine liposomal (EXPAREL) 1 3 % injection 20 mL, 20 mL, Infiltration, Once, Galileo Lees MD    ceFAZolin (ANCEF) IVPB (premix in dextrose) 2,000 mg 50 mL, 2,000 mg, Intravenous, Once, Jabier Cordero PA-C    lactated ringers infusion, 125 mL/hr, Intravenous, Continuous, Galileo Lees MD    No Known Allergies    Objective: There were no vitals filed for this visit  Ortho Exam    Physical Exam  Constitutional:       General: He is not in acute distress  Appearance: He is well-developed  HENT:      Head: Normocephalic and atraumatic  Eyes:      General: No scleral icterus  Conjunctiva/sclera: Conjunctivae normal    Neck:      Vascular: No JVD  Cardiovascular:      Rate and Rhythm: Normal rate  Pulmonary:      Effort: Pulmonary effort is normal  No respiratory distress  Skin:     General: Skin is warm  Neurological:      Mental Status: He is alert and oriented to person, place, and time        Coordination: Coordination normal          Left shoulder: Diffuse limitations in ROM

## 2022-08-18 NOTE — PLAN OF CARE
Problem: PAIN - ADULT  Goal: Verbalizes/displays adequate comfort level or baseline comfort level  Description: Interventions:  - Encourage patient to monitor pain and request assistance  - Assess pain using appropriate pain scale  - Administer analgesics based on type and severity of pain and evaluate response  - Implement non-pharmacological measures as appropriate and evaluate response  - Consider cultural and social influences on pain and pain management  - Notify physician/advanced practitioner if interventions unsuccessful or patient reports new pain  Outcome: Progressing     Problem: INFECTION - ADULT  Goal: Absence or prevention of progression during hospitalization  Description: INTERVENTIONS:  - Assess and monitor for signs and symptoms of infection  - Monitor lab/diagnostic results  - Monitor all insertion sites, i e  indwelling lines, tubes, and drains  - Monitor endotracheal if appropriate and nasal secretions for changes in amount and color  - Hunter appropriate cooling/warming therapies per order  - Administer medications as ordered  - Instruct and encourage patient and family to use good hand hygiene technique  - Identify and instruct in appropriate isolation precautions for identified infection/condition  Outcome: Progressing     Problem: SAFETY ADULT  Goal: Patient will remain free of falls  Description: INTERVENTIONS:  - Educate patient/family on patient safety including physical limitations  - Instruct patient to call for assistance with activity   - Consult OT/PT to assist with strengthening/mobility   - Keep Call bell within reach  - Keep bed low and locked with side rails adjusted as appropriate  - Keep care items and personal belongings within reach  - Initiate and maintain comfort rounds  - Make Fall Risk Sign visible to staff  - Offer Toileting every 2 Hours, in advance of need  - Initiate/Maintain bedalarm  - Obtain necessary fall risk management equipment:  yes  - Apply yellow socks and bracelet for high fall risk patients  - Consider moving patient to room near nurses station  Outcome: Progressing  Goal: Maintain or return to baseline ADL function  Description: INTERVENTIONS:  -  Assess patient's ability to carry out ADLs; assess patient's baseline for ADL function and identify physical deficits which impact ability to perform ADLs (bathing, care of mouth/teeth, toileting, grooming, dressing, etc )  - Assess/evaluate cause of self-care deficits   - Assess range of motion  - Assess patient's mobility; develop plan if impaired  - Assess patient's need for assistive devices and provide as appropriate  - Encourage maximum independence but intervene and supervise when necessary  - Involve family in performance of ADLs  - Assess for home care needs following discharge   - Consider OT consult to assist with ADL evaluation and planning for discharge  - Provide patient education as appropriate  Outcome: Progressing  Goal: Maintains/Returns to pre admission functional level  Description: INTERVENTIONS:  - Perform BMAT or MOVE assessment daily    - Set and communicate daily mobility goal to care team and patient/family/caregiver  - Collaborate with rehabilitation services on mobility goals if consulted  - Perform Range of Motion 3 times a day  - Reposition patient every 2 hours    - Dangle patient 3 times a day  - Stand patient 3   times a day  - Ambulate patient 3 times a day  - Out of bed to chair 3 times a day   - Out of bed for meals 3 times a day  - Out of bed for toileting  - Record patient progress and toleration of activity level   Outcome: Progressing     Problem: DISCHARGE PLANNING  Goal: Discharge to home or other facility with appropriate resources  Description: INTERVENTIONS:  - Identify barriers to discharge w/patient and caregiver  - Arrange for needed discharge resources and transportation as appropriate  - Identify discharge learning needs (meds, wound care, etc )  - Arrange for interpretive services to assist at discharge as needed  - Refer to Case Management Department for coordinating discharge planning if the patient needs post-hospital services based on physician/advanced practitioner order or complex needs related to functional status, cognitive ability, or social support system  Outcome: Progressing     Problem: Knowledge Deficit  Goal: Patient/family/caregiver demonstrates understanding of disease process, treatment plan, medications, and discharge instructions  Description: Complete learning assessment and assess knowledge base    Interventions:  - Provide teaching at level of understanding  - Provide teaching via preferred learning methods  Outcome: Progressing     Problem: SKIN/TISSUE INTEGRITY - ADULT  Goal: Incision(s), wounds(s) or drain site(s) healing without S/S of infection  Description: INTERVENTIONS  - Assess and document dressing, incision, wound bed, drain sites and surrounding tissue  - Provide patient and family education  - Perform skin care/dressing changes every shift  Outcome: Progressing     Problem: MUSCULOSKELETAL - ADULT  Goal: Maintain or return mobility to safest level of function  Description: INTERVENTIONS:  - Assess patient's ability to carry out ADLs; assess patient's baseline for ADL function and identify physical deficits which impact ability to perform ADLs (bathing, care of mouth/teeth, toileting, grooming, dressing, etc )  - Assess/evaluate cause of self-care deficits   - Assess range of motion  - Assess patient's mobility  - Assess patient's need for assistive devices and provide as appropriate  - Encourage maximum independence but intervene and supervise when necessary  - Involve family in performance of ADLs  - Assess for home care needs following discharge   - Consider OT consult to assist with ADL evaluation and planning for discharge  - Provide patient education as appropriate  Outcome: Progressing  Goal: Maintain proper alignment of affected body part  Description: INTERVENTIONS:  - Support, maintain and protect limb and body alignment  - Provide patient/ family with appropriate education  Outcome: Progressing

## 2022-08-18 NOTE — PLAN OF CARE
Problem: PAIN - ADULT  Goal: Verbalizes/displays adequate comfort level or baseline comfort level  Description: Interventions:  - Encourage patient to monitor pain and request assistance  - Assess pain using appropriate pain scale  - Administer analgesics based on type and severity of pain and evaluate response  - Implement non-pharmacological measures as appropriate and evaluate response  - Consider cultural and social influences on pain and pain management  - Notify physician/advanced practitioner if interventions unsuccessful or patient reports new pain  Outcome: Progressing     Problem: INFECTION - ADULT  Goal: Absence or prevention of progression during hospitalization  Description: INTERVENTIONS:  - Assess and monitor for signs and symptoms of infection  - Monitor lab/diagnostic results  - Monitor all insertion sites, i e  indwelling lines, tubes, and drains  - Monitor endotracheal if appropriate and nasal secretions for changes in amount and color  - Campbell appropriate cooling/warming therapies per order  - Administer medications as ordered  - Instruct and encourage patient and family to use good hand hygiene technique  - Identify and instruct in appropriate isolation precautions for identified infection/condition  Outcome: Progressing     Problem: SAFETY ADULT  Goal: Patient will remain free of falls  Description: INTERVENTIONS:  - Educate patient/family on patient safety including physical limitations  - Instruct patient to call for assistance with activity   - Consult OT/PT to assist with strengthening/mobility   - Keep Call bell within reach  - Keep bed low and locked with side rails adjusted as appropriate  - Keep care items and personal belongings within reach  - Initiate and maintain comfort rounds  - Make Fall Risk Sign visible to staff  - Offer Toileting every  Hours, in advance of need  - Initiate/Maintain alarm  - Obtain necessary fall risk management equipment:   - Apply yellow socks and bracelet for high fall risk patients  - Consider moving patient to room near nurses station  Outcome: Progressing  Goal: Maintain or return to baseline ADL function  Description: INTERVENTIONS:  -  Assess patient's ability to carry out ADLs; assess patient's baseline for ADL function and identify physical deficits which impact ability to perform ADLs (bathing, care of mouth/teeth, toileting, grooming, dressing, etc )  - Assess/evaluate cause of self-care deficits   - Assess range of motion  - Assess patient's mobility; develop plan if impaired  - Assess patient's need for assistive devices and provide as appropriate  - Encourage maximum independence but intervene and supervise when necessary  - Involve family in performance of ADLs  - Assess for home care needs following discharge   - Consider OT consult to assist with ADL evaluation and planning for discharge  - Provide patient education as appropriate  Outcome: Progressing  Goal: Maintains/Returns to pre admission functional level  Description: INTERVENTIONS:  - Perform BMAT or MOVE assessment daily    - Set and communicate daily mobility goal to care team and patient/family/caregiver  - Collaborate with rehabilitation services on mobility goals if consulted  - Perform Range of Motion  times a day  - Reposition patient every  hours    - Dangle patient  times a day  - Stand patient  times a day  - Ambulate patient times a day  - Out of bed to chair  times a day   - Out of bed for meals  times a day  - Out of bed for toileting  - Record patient progress and toleration of activity level   Outcome: Progressing     Problem: DISCHARGE PLANNING  Goal: Discharge to home or other facility with appropriate resources  Description: INTERVENTIONS:  - Identify barriers to discharge w/patient and caregiver  - Arrange for needed discharge resources and transportation as appropriate  - Identify discharge learning needs (meds, wound care, etc )  - Arrange for interpretive services to assist at discharge as needed  - Refer to Case Management Department for coordinating discharge planning if the patient needs post-hospital services based on physician/advanced practitioner order or complex needs related to functional status, cognitive ability, or social support system  Outcome: Progressing     Problem: Knowledge Deficit  Goal: Patient/family/caregiver demonstrates understanding of disease process, treatment plan, medications, and discharge instructions  Description: Complete learning assessment and assess knowledge base    Interventions:  - Provide teaching at level of understanding  - Provide teaching via preferred learning methods  Outcome: Progressing     Problem: SKIN/TISSUE INTEGRITY - ADULT  Goal: Incision(s), wounds(s) or drain site(s) healing without S/S of infection  Description: INTERVENTIONS  - Assess and document dressing, incision, wound bed, drain sites and surrounding tissue  - Provide patient and family education  - Perform skin care/dressing changes every   Outcome: Progressing     Problem: MUSCULOSKELETAL - ADULT  Goal: Maintain or return mobility to safest level of function  Description: INTERVENTIONS:  - Assess patient's ability to carry out ADLs; assess patient's baseline for ADL function and identify physical deficits which impact ability to perform ADLs (bathing, care of mouth/teeth, toileting, grooming, dressing, etc )  - Assess/evaluate cause of self-care deficits   - Assess range of motion  - Assess patient's mobility  - Assess patient's need for assistive devices and provide as appropriate  - Encourage maximum independence but intervene and supervise when necessary  - Involve family in performance of ADLs  - Assess for home care needs following discharge   - Consider OT consult to assist with ADL evaluation and planning for discharge  - Provide patient education as appropriate  Outcome: Progressing  Goal: Maintain proper alignment of affected body part  Description: INTERVENTIONS:  - Support, maintain and protect limb and body alignment  - Provide patient/ family with appropriate education  Outcome: Progressing

## 2022-08-19 VITALS
HEART RATE: 60 BPM | DIASTOLIC BLOOD PRESSURE: 60 MMHG | TEMPERATURE: 98.1 F | BODY MASS INDEX: 26.34 KG/M2 | OXYGEN SATURATION: 98 % | HEIGHT: 67 IN | WEIGHT: 167.8 LBS | SYSTOLIC BLOOD PRESSURE: 135 MMHG | RESPIRATION RATE: 18 BRPM

## 2022-08-19 LAB
ALBUMIN SERPL BCP-MCNC: 3.1 G/DL (ref 3.5–5)
ALP SERPL-CCNC: 64 U/L (ref 46–116)
ALT SERPL W P-5'-P-CCNC: 16 U/L (ref 12–78)
ANION GAP SERPL CALCULATED.3IONS-SCNC: 10 MMOL/L (ref 4–13)
ANION GAP SERPL CALCULATED.3IONS-SCNC: 10 MMOL/L (ref 4–13)
AST SERPL W P-5'-P-CCNC: 19 U/L (ref 5–45)
BILIRUB SERPL-MCNC: 0.74 MG/DL (ref 0.2–1)
BUN SERPL-MCNC: 13 MG/DL (ref 5–25)
BUN SERPL-MCNC: 13 MG/DL (ref 5–25)
CALCIUM ALBUM COR SERPL-MCNC: 9.2 MG/DL (ref 8.3–10.1)
CALCIUM SERPL-MCNC: 8.5 MG/DL (ref 8.3–10.1)
CALCIUM SERPL-MCNC: 8.7 MG/DL (ref 8.3–10.1)
CHLORIDE SERPL-SCNC: 103 MMOL/L (ref 96–108)
CHLORIDE SERPL-SCNC: 104 MMOL/L (ref 96–108)
CO2 SERPL-SCNC: 22 MMOL/L (ref 21–32)
CO2 SERPL-SCNC: 26 MMOL/L (ref 21–32)
CREAT SERPL-MCNC: 0.76 MG/DL (ref 0.6–1.3)
CREAT SERPL-MCNC: 0.79 MG/DL (ref 0.6–1.3)
ERYTHROCYTE [DISTWIDTH] IN BLOOD BY AUTOMATED COUNT: 14.7 % (ref 11.6–15.1)
GFR SERPL CREATININE-BSD FRML MDRD: 85 ML/MIN/1.73SQ M
GFR SERPL CREATININE-BSD FRML MDRD: 86 ML/MIN/1.73SQ M
GLUCOSE SERPL-MCNC: 134 MG/DL (ref 65–140)
GLUCOSE SERPL-MCNC: 143 MG/DL (ref 65–140)
HCT VFR BLD AUTO: 34.7 % (ref 36.5–49.3)
HGB BLD-MCNC: 11.2 G/DL (ref 12–17)
MCH RBC QN AUTO: 29.1 PG (ref 26.8–34.3)
MCHC RBC AUTO-ENTMCNC: 32.3 G/DL (ref 31.4–37.4)
MCV RBC AUTO: 90 FL (ref 82–98)
PLATELET # BLD AUTO: 234 THOUSANDS/UL (ref 149–390)
PMV BLD AUTO: 10.1 FL (ref 8.9–12.7)
POTASSIUM SERPL-SCNC: 4 MMOL/L (ref 3.5–5.3)
POTASSIUM SERPL-SCNC: 4.4 MMOL/L (ref 3.5–5.3)
PROT SERPL-MCNC: 6.1 G/DL (ref 6.4–8.4)
RBC # BLD AUTO: 3.85 MILLION/UL (ref 3.88–5.62)
SODIUM SERPL-SCNC: 136 MMOL/L (ref 135–147)
SODIUM SERPL-SCNC: 139 MMOL/L (ref 135–147)
WBC # BLD AUTO: 14.05 THOUSAND/UL (ref 4.31–10.16)

## 2022-08-19 PROCEDURE — 85027 COMPLETE CBC AUTOMATED: CPT | Performed by: PHYSICIAN ASSISTANT

## 2022-08-19 PROCEDURE — 80048 BASIC METABOLIC PNL TOTAL CA: CPT | Performed by: PHYSICIAN ASSISTANT

## 2022-08-19 PROCEDURE — 97535 SELF CARE MNGMENT TRAINING: CPT

## 2022-08-19 PROCEDURE — 99024 POSTOP FOLLOW-UP VISIT: CPT | Performed by: PHYSICIAN ASSISTANT

## 2022-08-19 PROCEDURE — 97167 OT EVAL HIGH COMPLEX 60 MIN: CPT

## 2022-08-19 RX ORDER — OXYCODONE HYDROCHLORIDE 5 MG/1
5 TABLET ORAL EVERY 4 HOURS PRN
Qty: 20 TABLET | Refills: 0 | Status: SHIPPED | OUTPATIENT
Start: 2022-08-19

## 2022-08-19 RX ADMIN — OXYBUTYNIN 10 MG: 5 TABLET, FILM COATED, EXTENDED RELEASE ORAL at 09:00

## 2022-08-19 RX ADMIN — PANTOPRAZOLE SODIUM 40 MG: 40 TABLET, DELAYED RELEASE ORAL at 05:43

## 2022-08-19 RX ADMIN — CEFAZOLIN SODIUM 2000 MG: 2 SOLUTION INTRAVENOUS at 01:52

## 2022-08-19 RX ADMIN — AMLODIPINE BESYLATE 5 MG: 5 TABLET ORAL at 09:01

## 2022-08-19 RX ADMIN — OXYCODONE HYDROCHLORIDE 5 MG: 5 TABLET ORAL at 12:16

## 2022-08-19 RX ADMIN — DOCUSATE SODIUM 100 MG: 100 CAPSULE, LIQUID FILLED ORAL at 09:01

## 2022-08-19 NOTE — PLAN OF CARE
Problem: PAIN - ADULT  Goal: Verbalizes/displays adequate comfort level or baseline comfort level  Description: Interventions:  - Encourage patient to monitor pain and request assistance  - Assess pain using appropriate pain scale  - Administer analgesics based on type and severity of pain and evaluate response  - Implement non-pharmacological measures as appropriate and evaluate response  - Consider cultural and social influences on pain and pain management  - Notify physician/advanced practitioner if interventions unsuccessful or patient reports new pain  8/19/2022 1123 by Jamshid Crooks RN  Outcome: Progressing  8/19/2022 1123 by Jamshid Crooks RN  Outcome: Progressing     Problem: INFECTION - ADULT  Goal: Absence or prevention of progression during hospitalization  Description: INTERVENTIONS:  - Assess and monitor for signs and symptoms of infection  - Monitor lab/diagnostic results  - Monitor all insertion sites, i e  indwelling lines, tubes, and drains  - Monitor endotracheal if appropriate and nasal secretions for changes in amount and color  - O'Brien appropriate cooling/warming therapies per order  - Administer medications as ordered  - Instruct and encourage patient and family to use good hand hygiene technique  - Identify and instruct in appropriate isolation precautions for identified infection/condition  8/19/2022 1123 by Jamshid Crooks RN  Outcome: Progressing  8/19/2022 1123 by Jamshid Crooks RN  Outcome: Progressing

## 2022-08-19 NOTE — PROGRESS NOTES
Progress Note - Orthopedics   Solmon Cranker 76 y o  male MRN: [id number]  Unit/Bed#: [address] [place] [address] Encounter: [id number]      Subjective:  Status post left total shoulder replacement performed yesterday  The patient is doing well notes minimal pain about the shoulder  He has been up ambulating in the hallway  He denies any chest pain, shortness of breath, or dizziness  Still notes some paresthesias into the left hand, but notes good sensation of the shoulder  No acute overnight events  Vitals: Blood pressure 135/60, pulse 58, temperature 98 1 °F (36 7 °C), resp  rate 18, height 5' 7" (1 702 m), weight 76 1 kg (167 lb 12 8 oz), SpO2 97 %  ,Body mass index is 26 28 kg/m²  Intake/Output Summary (Last 24 hours) at 8/19/2022 0823  Last data filed at 8/19/2022 0152  Gross per 24 hour   Intake 1360 ml   Output 750 ml   Net 610 ml       Invasive Devices  Report    Peripheral Intravenous Line  Duration           Peripheral IV 08/18/22 Right Forearm <1 day                Ortho Exam: Alert and oriented X 3  Dressing CDI  Mild swelling shoulder  Paresthesias into hand, but good sensation about the shoulder  Patient moves all fingers freely  2+ radial pulse  Lab, Imaging and other studies: I have personally reviewed pertinent lab results  Post-operative xrays showed hardware intact with no fracture    CBC:   Lab Results   Component Value Date    WBC 14 05 (H) 08/19/2022    HGB 11 2 (L) 08/19/2022    HCT 34 7 (L) 08/19/2022    MCV 90 08/19/2022     08/19/2022    MCH 29 1 08/19/2022    MCHC 32 3 08/19/2022    RDW 14 7 08/19/2022    MPV 10 1 08/19/2022    NRBC 0 07/01/2022     CMP:   Lab Results   Component Value Date     08/19/2022    CO2 22 08/19/2022    BUN 13 08/19/2022    CREATININE 0 76 08/19/2022    CALCIUM 8 7 08/19/2022    AST 19 08/18/2022    ALT 16 08/18/2022    ALKPHOS 64 08/18/2022    EGFR 86 08/19/2022     PT/INR:   Lab Results   Component Value Date    INR 1 14 07/01/2022 Assessment:  Status post left total shoulder replacement    Plan:  The patient is doing well we do plan on discharging home today following PT/OT this morning  He will remain in his sling for the 1st 6 weeks postoperatively  His dressing will stay in place  Pain medication has been provided  Please see discharge instructions for further details  Patient will follow up in 1 week for repeat evaluation in the office

## 2022-08-19 NOTE — PHYSICAL THERAPY NOTE
PT Screen Note       08/19/22 1152   PT Last Visit   PT Visit Date 08/19/22   Note Type   Note type Screen   Cancel Reasons Other  (Per discussion with OT patient IND with no acute PT needs at this time )   [facility] Insurance Number  Nguyen Caswell [id number]

## 2022-08-19 NOTE — UTILIZATION REVIEW
Initial Clinical Review    Elective Inpatient surgical procedure  Age/Sex: 76 y o  male  Surgery Date: 08/18/2022  Procedure:   Left total shoulder arthroplasty utilizing Depuy Global unite anatomic proximal body 135 degree size 14 [place] and Global unite [place]oat standard stem size 14 x 129 mm with an anchor peg glenoid cross-linked polyethylene size 52 mm cemented and a Global unite eccentric humeral head size 52 x 21 mm and long head of biceps tenodesis  Anesthesia:   General with Regional  Operative Findings:   Left shoulder exam under anesthesia demonstrated forward flexion to 120° abduction to 70° external rotation is 0° internal rotation is 0°  Intra-articular findings demonstrated intact rotator cuff throughout however long head of biceps tendon did have significant tearing requiring a tenodesis  We also demonstrated large osteophytes and no articular cartilage remaining along the glenoid or humeral head  We did remove the osteophytes in standard fashion  We were able to achieve a very stable prosthesis at the end the operation with forward flexion to 160° abduction 150° external rotation to 90° internal rotation to 90°  Very stable Shuck and good fit was appreciated at the end of the operation      POD#1 Progress Note (08/19/2022):  S/P :eft Total Shoulder Replacement  Still notes paresthesias into the left hand  Analgesia PRN  Able to move all fingers  Maintain sling        Admission Orders: Date/Time/Statement:   Admission Orders (From admission, onward)     Ordered        08/18/22 1159  Inpatient Admission  Once                      Orders Placed This Encounter   Procedures    Inpatient Admission     Standing Status:   Standing     Number of Occurrences:   1     Order Specific Question:   Level of Care     Answer:   Med Surg [16]     Order Specific Question:   Estimated length of stay     Answer:   Not Applicable     Vital Signs: /60 (BP Location: Right arm)   Pulse 60   Temp 98 1 °F (36 7 °C) (Oral)   Resp 18   Ht 5' 7" (1 702 m)   Wt 76 1 kg (167 lb 12 8 oz)   SpO2 98%   BMI 26 28 kg/m²     Pertinent Labs/Diagnostic Test Results:   XR shoulder left 1 view   Final Result by Raphael Hawkins MD (08/18 1448)      Unremarkable appearance of total shoulder arthroplasty  Results from last 7 days   Lab Units 08/19/22  0549   WBC Thousand/uL 14 05*   HEMOGLOBIN g/dL 11 2*   HEMATOCRIT % 34 7*   PLATELETS Thousands/uL 234     Results from last 7 days   Lab Units 08/19/22  0549 08/18/22  2346   SODIUM mmol/L 136 139   POTASSIUM mmol/L 4 4 4 0   CHLORIDE mmol/L 104 103   CO2 mmol/L 22 26   ANION GAP mmol/L 10 10   BUN mg/dL 13 13   CREATININE mg/dL 0 76 0 79   EGFR ml/min/1 73sq m 86 85   CALCIUM mg/dL 8 7 8 5     Results from last 7 days   Lab Units 08/18/22  2346   AST U/L 19   ALT U/L 16   ALK PHOS U/L 64   TOTAL PROTEIN g/dL 6 1*   ALBUMIN g/dL 3 1*   TOTAL BILIRUBIN mg/dL 0 74     Results from last 7 days   Lab Units 08/19/22  0549 08/18/22  2346   GLUCOSE RANDOM mg/dL 134 143*     Diet:  Regular  Mobility: OOB q8h  Ambulation    PT/OT  DVT Prophylaxis:   Dionte SCDs    Medications/Pain Control:   Scheduled Medications:  amLODIPine, 5 mg, Oral, Daily  apixaban, 5 mg, Oral, BID  aspirin, 81 mg, Oral, Daily  atorvastatin, 40 mg, Oral, Daily With Dinner  docusate sodium, 100 mg, Oral, BID  latanoprost, 1 drop, Both Eyes, HS  metoprolol succinate, 12 5 mg, Oral, Daily  oxybutynin, 10 mg, Oral, BID  pantoprazole, 40 mg, Oral, Early Morning      Continuous IV Infusions:  lactated ringers infusion  Rate: 50 mL/hr Dose: 50 mL/hr  Freq: Continuous Route: IV  Indications of Use: IV Hydration  Last Dose: 50 mL/hr (08/18/22 1320)  Start: 08/18/22 1300 End: 08/18/22 1819  lactated ringers infusion  Rate: 125 mL/hr Dose: 125 mL/hr  Freq: Continuous Route: IV  Last Dose: 0  (08/18/22 1050)  Start: 08/18/22 0900 End: 08/18/22 1256    PRN Meds:  acetaminophen, 650 mg, Oral, Q6H PRN  ondansetron, 4 mg, Intravenous, Q6H PRN  oxyCODONE, 5 mg, Oral, Q4H PRN        Network Utilization Review Department  ATTENTION: Please call with any questions or concerns to [phone] and carefully listen to the prompts so that you are directed to the right person  All voicemails are confidential   Morgan Steven all requests for admission clinical reviews, approved or denied determinations and any other requests to dedicated fax number below belonging to the campus where the patient is receiving treatment   List of dedicated fax numbers for the Facilities:  [address] DENIALS (Administrative/Medical Necessity) [phone]   [address] (Maternity/NICU/Pediatrics) [phone]   [address]  [address] [facility] [address] [facility] [place] [address] [address] [place] [address] [address]ya Gail K[address] [facility] [place] [place] [address] [phone]

## 2022-08-19 NOTE — DISCHARGE INSTRUCTIONS
Sling:   Wear your sling at all times after your surgery (this includes sleeping)  Additionally, you should not carry anything heavier than a pencil in your hand  Dressing:   Leave dressing in place  Showering: You may shower 3 days after surgery  Please use CAUTION!! Be careful not to slip and fall  The effects of anesthesia and/or medication may make you drowsy or light-headed  Do not soak in a bathtub, hot tub, or pool until the doctor tells you it is O K , to do so  Your dressing should be waterproof  If this gets soaked in the shower you may remove this  While in the shower you must keep the arm across the front of the body as if it were still in the sling  Sleeping:   You will most likely have difficulty sleeping in the first few weeks after surgery  Most people find it more comfortable to sleep in a reclining position  You can either sleep in a recliner chair or create this position with pillows  Ice:   You can ice the shoulder to reduce swelling and discomfort  Do not ice the shoulder more than 20 minutes at a time  Let the shoulder warm up before reapplication  Avoid getting you wound wet  If you have a Cryocuff you may keep this on continuously  Follow-up visit:   You need to see the doctor about one week following surgery for your first post-op visit  You will be given a prescription to begin physical therapy if you were not already given one  Common concerns:   Bruising and/or swelling of the shoulder, arm, or hand are common after surgery  To relieve this discomfort it is best to ice the shoulder  Please call if:   Any oozing or redness of the wound, fevers (>101 3°F), or chills  2  Any difficulty breathing or heaviness in the chest      REMEMBER - these are only guidelines for what to expect  If you have any questions or concerns, please do not hesitate to call the office  [phone]

## 2022-08-19 NOTE — OCCUPATIONAL THERAPY NOTE
Occupational Therapy Evaluation/Treatment       08/19/22 0958   Note Type   Note type Evaluation   Restrictions/Precautions   LUE Weight Bearing Per Order NWB   Braces or Orthoses Sling  (abductor brace)   Other Precautions Pain   Pain Assessment   Pain Assessment Tool 0-10   Pain Score 4   Pain Location/Orientation Orientation: Left; Location: Shoulder   Home Living   Type of [address] One level  (5 REMA)   Bathroom Shower/Tub Walk-in [address]  (uses cane when outside)   Prior Function   Level of [place] Independent with ADLs and functional mobility   Lives With Significant other   ADL Assistance Independent   IADLs Independent   Comments Patient is POD#1 s/p L TSA     Lifestyle   Intrinsic Gratification to walk 3 miles per day   Subjective   Subjective "I'm ready to go home"   ADL   Eating Assistance 5  Supervision/Setup   Grooming Assistance 5  Supervision/Setup   UB Bathing Assistance 3  Moderate Assistance   LB Bathing Assistance 4  Minimal Assistance   UB Dressing Assistance 3  Moderate Assistance   LB Dressing Assistance 4  Minimal [address]  5  Supervision/Setup   Bed Mobility   Supine to Sit 5  Supervision   Transfers   Sit to Stand 7  Independent   Stand to Sit 7  Independent   Functional Mobility   Functional Mobility 7  Independent   Additional Comments functional household distance   Balance   Static Sitting Good   Dynamic Sitting Good   Static Standing Good   Dynamic Standing Good   Activity Tolerance   Activity Tolerance Patient tolerated treatment well   Nurse Made Aware yes, Babita   RUE Assessment   RUE Assessment WFL   LUE Assessment   LUE Assessment   (s/p TSA with abductor brace, elbow AAROM WFL, wrist and hand WFL AROM)   Sensation   Light Touch Partial deficits in the LUE   Cognition   Overall Cognitive Status WFL   Arousal/Participation Cooperative   Attention Within functional limits   Orientation Level Oriented X4   Following Commands Follows all commands and directions without difficulty   Assessment   Limitation Decreased ADL status; Decreased Safe judgement during ADL;Decreased self-care trans;Decreased high-level ADLs   Prognosis Good   Assessment Patient evaluated by Occupational Therapy  Patient admitted with Primary osteoarthritis of left shoulder  The patients occupational profile, medical and therapy history includes a extensive additional review of physical, cognitive, or psychosocial history related to current functional performance  Comorbidities affecting functional mobility and ADLS include: CAD and hypertension and spinal fusion  Prior to admission, patient was independent with functional mobility with cane when outside, independent with ADLS and independent with IADLS  The evaluation identifies the following performance deficits: weakness, decreased ROM, decreased endurance, decreased ADLS, decreased IADLS, pain, decreased activity tolerance, ortheopedic restrictions and decreased strength, that result in activity limitations and/or participation restrictions  This evaluation requires clinical decision making of high complexity, because the patient presents with comorbidites that affect occupational performance and required significant modification of tasks or assistance with consideration of multiple treatment options  The Barthel Index was used as a functional outcome tool presenting with a score of Barthel Index Score: 80, indicating minimal limitations of functional mobility and ADLS  The patient's raw score on the -PAC Daily Activity inpatient short form is 17, standardized score is 37 26, less than 39 4  Patients at this level are likely to benefit from DC to post-acute rehabilitation services, however pt s/p TSA and has assist from significant other for ADLS and IADLS at home  Please refer to the recommendation of the Occupational Therapist for safe DC planning   Patient will benefit from skilled Occupational Therapy services to address above deficits and facilitate a safe return to prior level of function  Patient is independent with mobility, transfers and steps, no skilled PT needs required at this time  Goals   Patient Goals to go home   STG Time Frame   (1-7 days)   Short Term Goal  Patient will verbalize shoulder precautions at least 75% of the time with minimal cueing to promote safety during ADL/iADL and functional mobility performance; Patient will don/doff sling with min assist; Patient will perform upper body dressing while adhering to shoulder precautions with min assist  Patient/family will perform shoulder exercises with supervision; Patient will increase bed mobility to independent in preparation for ADLS and transfers  LTG Time Frame   (8-14 days)   Long Term Goal Patient will verbalize shoulder precautions at least 100% of the time to promote safety during ADL/iADL and functional mobility performance; Patient will don/doff sling with supervision; Patient will perform upper body dressing while adhering to shoulder precautions with supervision  Patient/family will perform shoulder exercises independently  ADL Goals   Pt Will Perform Eating   (STG independent)   Pt Will Perform Grooming   (STG independent)   Pt Will Perform Bathing   (STG min assist LTG independent)   Pt Will Perform UE Dressing   (STG min assist LTG independent)   Pt Will Perform LE Dressing   (STG supervision LTG Independent)   Plan   Treatment Interventions ADL retraining;UE strengthening/ROM; Patient/family training; Activityengagement; Compensatory technique education   Goal Expiration Date 09/02/22   OT Frequency 5x/wk   Additional Treatment Session   Start Time 0930   End Time 0958   Treatment Assessment Functional mobility for functional community distance independent  Independent up and down 4 steps with use of 1 rail   Patient and patient's family educated on total shoulder arthroplasty general precautions: NWB LeftUE, NO active ROM shoulder, encourage active ROM elbow/wrist/hand, sling at all times; Patient doffed shoulder sling mod assist ;Patient completed L UE PROM shoulder exercises per Total Shoulder Arthroplasty protocol in supine; Patient able to tolerate Forward flexion approximately 60, external rotation approximately 20 passive internal rotation to chest; Patient performed supine to sit independently; Patient performed lower body dressing: don pants with supervision, mod assist for donning socks and shoes (pt has a long handled shoe horn and sock aid at home); Patient educated on safe upper body dressing technique while adhering to shoulder precautions; Patient able to don button up shirt with mod assist; Patient educated on shoulder sling management including:proper positioning, wear schedule, and how to don/doff;  Patient donned sling with with mod assist; Patient completed pendulum exercises to L shoulder x 10 each and demonstrated understanding and handout given; Patient verbalized/demonstrated good understanding of all OT shoulder education     Recommendation   OT Discharge Recommendation Home with outpatient rehabilitation   AM-PAC Daily Activity Inpatient   Lower Body Dressing 3   Bathing 2   Toileting 3   Upper Body Dressing 2   Grooming 3   Eating 4   Daily Activity Raw Score 17   Daily Activity Standardized Score (Calc for Raw Score >=11) 37 26   AM-PAC Applied Cognition Inpatient   Following a Speech/Presentation 4   Understanding Ordinary Conversation 4   Taking Medications 4   Remembering Where Things Are Placed or Put Away 4   Remembering List of 4-5 Errands 4   Taking Care of Complicated Tasks 4   Applied Cognition Raw Score 24   Applied Cognition Standardized Score 62 21   Barthel Index   Feeding 5   Bathing 0   Grooming Score 0   Dressing Score 5   Bladder Score 10   Bowels Score 10   Toilet Use Score 10   Transfers (Bed/Chair) Score 15   Mobility (Level Surface) Score 15   Stairs Score 10 Barthel Index Score [address]   Licensure   [place] License Number  Elva Owenssalome Gino 85 OTR/L [id number]

## 2022-08-23 NOTE — UTILIZATION REVIEW
Notification of Discharge   This is a Notification of Discharge from our facility [address]  Please be advised that this patient has been discharge from our facility  Below you will find the admission and discharge date and time including the patients disposition  UTILIZATION REVIEW CONTACT:  Seb Clark  Utilization   Network Utilization Review Department  Phone: [phone] x carefully listen to the prompts  All voicemails are confidential   Email: [email or web address]     PHYSICIAN ADVISORY SERVICES:  FOR [facility] REVIEW - MEDICAL NECESSITY DENIAL  Phone: [phone]  Fax: [phone]  Email: [email or web address]     PRESENTATION DATE: 8/18/2022  8:22 AM  OBERVATION ADMISSION DATE:   INPATIENT ADMISSION DATE: 8/18/22 11:59 AM   DISCHARGE DATE: 8/19/2022 12:48 PM  DISPOSITION: Home/Self Care Home/Self Care      IMPORTANT INFORMATION:  Send all requests for admission clinical reviews, approved or denied determinations and any other requests to dedicated fax number below belonging to the campus where the patient is receiving treatment   List of dedicated fax numbers:  [address] DENIALS (Administrative/Medical Necessity) [phone]   [address] (Maternity/NICU/Pediatrics) [phone]   [facility] [phone]   [address] [facility] [phone]   [address] [place] [address] [phone]   [address] [phone]   [facility]  [phone]   [address] [place] [address], [facility]  [address] [facility] [address] [facility] [phone]

## 2022-08-24 ENCOUNTER — OFFICE VISIT (OUTPATIENT)
Dept: OBGYN CLINIC | Facility: CLINIC | Age: 77
End: 2022-08-24

## 2022-08-24 ENCOUNTER — APPOINTMENT (OUTPATIENT)
Dept: RADIOLOGY | Facility: CLINIC | Age: 77
End: 2022-08-24
Payer: COMMERCIAL

## 2022-08-24 VITALS — TEMPERATURE: 98.4 F

## 2022-08-24 DIAGNOSIS — M19.012 PRIMARY OSTEOARTHRITIS OF LEFT SHOULDER: ICD-10-CM

## 2022-08-24 DIAGNOSIS — M19.012 PRIMARY OSTEOARTHRITIS OF LEFT SHOULDER: Primary | ICD-10-CM

## 2022-08-24 PROCEDURE — 73030 X-RAY EXAM OF SHOULDER: CPT

## 2022-08-24 PROCEDURE — 99024 POSTOP FOLLOW-UP VISIT: CPT | Performed by: PHYSICIAN ASSISTANT

## 2022-08-24 NOTE — PROGRESS NOTES
Assessment/Plan:  1  Primary osteoarthritis of left shoulder  XR shoulder 2+ vw left     I did adjust the patient's sling today to provide more support to the hand and wrist, which hopefully help the swelling  I did encourage him to move all of his fingers and to work on making a full fist   This should also help his swelling  He will start physical therapy in 1 more week on the total shoulder arthroplasty protocol  He will remain in his sling for the 1st 6 weeks postoperatively  I did explain that his ecchymosis is quite common after this procedure  He will follow up in 5 weeks for repeat evaluation  There are no signs of infection today  Subjective:   Summer Crooks is a 76 y o  male who presents today for follow-up of his left shoulder, now  6 days status post total shoulder replacement  He does still complain of pain about the shoulder as well as significant ecchymosis down the arm and into the chest   He also complains of swelling into the left hand  He notes good sensation of the left upper extremity though  He has been compliant with wearing his sling        Review of Systems      Past Medical History:   Diagnosis Date    Coronary artery disease     CPAP (continuous positive airway pressure) dependence     Hypercholesterolemia     Hypertension     Sleep apnea        Past Surgical History:   Procedure Laterality Date    BACK SURGERY      CARDIAC LOOP RECORDER      CORONARY STENT PLACEMENT      x 2    [place] RECONSTR TOTAL SHOULDER IMPLANT Left 8/18/2022    Procedure: LEFT SHOULDER STANDARD TOTAL SHOULDER ARTHROPLASTY AND BICEPS TENODESIS;  Surgeon: Pravin Box MD;  Location: [facility];  Service: Orthopedics    SPINAL FUSION      L2, L3 and L4    SPINAL STIMULATOR PLACEMENT  09/2021       Family History   Problem Relation Age of Onset    No Known Problems Mother        Social History     Occupational History    Not on file   Tobacco Use    Smoking status: Former Smoker Types: Cigarettes    Smokeless tobacco: Never Used   Substance and Sexual Activity    Alcohol use: Not Currently    Drug use: Yes     Types: Marijuana     Comment: Medical Marijuana    Sexual activity: Not on file         Current Outpatient Medications:     amLODIPine (NORVASC) 5 mg tablet, 10 mg 10mg once daily, Disp: , Rfl:     aspirin (ECOTRIN LOW STRENGTH) 81 mg EC tablet, Take 81 mg by mouth daily Last dose 8/11/22, Disp: , Rfl:     atorvastatin (LIPITOR) 40 mg tablet, daily at bedtime, Disp: , Rfl:     Azelastine HCl 137 MCG/SPRAY SOLN, , Disp: , Rfl:     Eliquis 5 MG, 5 mg 2 (two) times a day (Patient not taking: Reported on 8/11/2022), Disp: , Rfl:     latanoprost (XALATAN) 0 005 % ophthalmic solution, , Disp: , Rfl:     metoprolol succinate (TOPROL-XL) 25 mg 24 hr tablet, daily at bedtime Takes  1/2 tab, Disp: , Rfl:     oxybutynin (DITROPAN-XL) 10 MG 24 hr tablet, 10 mg  in the morning and 10 mg before bedtime  , Disp: , Rfl:     oxyCODONE (Roxicodone) 5 immediate release tablet, Take 1 tablet (5 mg total) by mouth every 4 (four) hours as needed for moderate pain for up to 20 doses Max Daily Amount: 30 mg, Disp: 20 tablet, Rfl: 0    pantoprazole (PROTONIX) 40 mg tablet, , Disp: , Rfl:     Prenatal Vit-Fe Fumarate-FA (M-Vit) tablet, Take 1 tablet by mouth daily, Disp: , Rfl:     No Known Allergies    Objective:  Vitals:    08/24/22 1301   Temp: 98 4 °F (36 9 °C)       Ortho Exam    Physical Exam   Left shoulder:  Dressing removed  Incision clean dry and intact  No erythema appreciated  Shoulder range of motion and strength testing deferred  Patient moves all fingers freely  Diffuse swelling noted into the hand  Ecchymosis extending down the upper arm and into the left chest wall  Sensation intact left upper extremity  2+ radial pulse      I have personally reviewed pertinent films in PACS and my interpretation is as follows:  X-rays left shoulder:  Status post total shoulder replacement  Prosthesis in acceptable position with no evidence of fracture or loosening

## 2022-08-30 ENCOUNTER — EVALUATION (OUTPATIENT)
Dept: PHYSICAL THERAPY | Facility: CLINIC | Age: 77
End: 2022-08-30
Payer: COMMERCIAL

## 2022-08-30 DIAGNOSIS — M19.012 LOCALIZED OSTEOARTHRITIS OF LEFT SHOULDER: ICD-10-CM

## 2022-08-30 DIAGNOSIS — M25.512 CHRONIC LEFT SHOULDER PAIN: Primary | ICD-10-CM

## 2022-08-30 DIAGNOSIS — G89.29 CHRONIC LEFT SHOULDER PAIN: Primary | ICD-10-CM

## 2022-08-30 PROCEDURE — 97161 PT EVAL LOW COMPLEX 20 MIN: CPT | Performed by: PHYSICAL THERAPIST

## 2022-08-30 NOTE — PROGRESS NOTES
PT Evaluation     Today's date: 2022  Patient name: Luis Enrique Remy  : 1945  MRN: [id number]  Referring provider: Jose Pearson  Dx:   Encounter Diagnosis     ICD-10-CM    1  Chronic left shoulder pain  M25 512     G89 29    2  Localized osteoarthritis of left shoulder  M19 012                   Assessment  Assessment details: Luis Enrique Remy is a 76 y o  male who presents to physical therapy with pain, decreased UE range of motion, decreased UE strength, impaired function, decreased activity tolerance and poor posture  Patient's clinical presentation is consistent with their referring diagnosis of Chronic left shoulder pain  (primary encounter diagnosis)  Localized osteoarthritis of left shoulder  The pt presents with functional limitations of ADLs, recreational activities, self-care and reaching  Pt would benefit from physical therapy services to address these limitations and maximize function  Pt was instructed and educated on good sitting posture today and demonstrates understanding  Impairments: abnormal muscle firing, abnormal muscle tone, abnormal or restricted ROM, activity intolerance, impaired physical strength, lacks appropriate home exercise program, pain with function, scapular dyskinesis, poor posture  and poor body mechanics  Understanding of Dx/Px/POC: good   Prognosis: good    Goals  Short term goals:  (6 weeks)  1  Patient will have pain level 2/10 left shoulder at rest  2  Patient will report a 50% improvement in symptoms with ADL's  3  Patient will demonstrate appropriate scapulohumeral rhythm with reaching shoulder height and below  4  Patient will have improved left shoulder ROM by 20 degrees    Long term goals: (12 weeks)  1  Patient will report 85 % improvement improvement in symptoms with ADL's  2  Patient will have pain level 2/10 left shoulder with ADL's   3  Patient will demonstrate appropriate scapulohumeral rhythm with reaching overhead  4   Patient will be independent in a comprehensive home exercise program      Plan  Patient would benefit from: PT eval and skilled physical therapy  Planned modality interventions: cryotherapy and thermotherapy: hydrocollator packs  Planned therapy interventions: joint mobilization, manual therapy, massage, neuromuscular re-education, patient education, postural training, strengthening, stretching, therapeutic activities, ADL training, functional ROM exercises, home exercise program, abdominal trunk stabilization and therapeutic exercise  Frequency: 2x week  Duration in weeks: 12  Treatment plan discussed with: patient        Subjective Evaluation    History of Present Illness  Date of surgery: 2022  Mechanism of injury: He reports many year history of left shoulder pain  He has had PT and injections multiple times over that time frame  It became very difficult to even move his left arm about 6 months ago  He followed up with Dr Robin Forrester  He underwent left TSA almost 2 weeks ago  He has now been referred to PT  Pain  Current pain ratin  At best pain ratin  At worst pain ratin  Location: Left shoulder and upper arm  Quality: sharp and dull ache  Relieving factors: ice and rest    Social Support    Employment status: not working (retired)  Hand dominance: right  Exercise history: Walking    Patient Goals  Patient goals for therapy: decreased pain and independence with ADLs/IADLs  Patient's goals regarding treatment: Driving  Objective     Postural Observations  Seated posture: fair    Additional Postural Observation Details  He is wearing an abduction pillow sling on left arm  Palpation   Left   Hypertonic in the pectoralis minor and upper trapezius  Tenderness of the pectoralis minor and upper trapezius       Passive Range of Motion   Left Shoulder   Flexion: 41 degrees   External rotation 0°: -11 degrees   Internal rotation 0°: 53 degrees     Right Shoulder   Flexion: 145 degrees   Abduction: 140 degrees   External rotation 0°: 72 degrees   Internal rotation 0°: 85 degrees     Scapular Mobility   Left Shoulder   Scapular mobility: fair    Strength/Myotome Testing     Right Shoulder     Planes of Motion   Flexion: 4+   Abduction: 4+   External rotation at 0°: 4+   Internal rotation at 0°: 4+     Additional Strength Details  Left MMT was not assessed today               Eval/ Re-eval Auth #/ Referral # Total visits Start date  Expiration date Total active units  Total manual units  PT only or  PT+OT?    8/30  IE                                     Date: 8/30          Total units authorized  Active:            Manual:           Total units remaining  Active:               Manual:                Date:           Total units authorized  Active:            Manual:           Total units remaining  Active:               Manual:               Precautions:  Significant LBP and history, 2 stents placed in 2020, Hypertension,  Left TSA 8/18/22,      TSA protocol    Specialty Daily Treatment Diary       Manuals 8/30/22       Visit # 1       STM UT, pec group        PROM shld        ST joint mobs                Warm-up                Neuro Re-Ed        Posture correct        Scap squeeze                                Ther Ex        Gripping        Wrist flex / ext 20       Pendulums 20                                               Ther Activity                                Modalities        CP

## 2022-09-01 ENCOUNTER — OFFICE VISIT (OUTPATIENT)
Dept: PHYSICAL THERAPY | Facility: CLINIC | Age: 77
End: 2022-09-01
Payer: COMMERCIAL

## 2022-09-01 DIAGNOSIS — G89.29 CHRONIC LEFT SHOULDER PAIN: Primary | ICD-10-CM

## 2022-09-01 DIAGNOSIS — M25.512 CHRONIC LEFT SHOULDER PAIN: Primary | ICD-10-CM

## 2022-09-01 DIAGNOSIS — M19.012 LOCALIZED OSTEOARTHRITIS OF LEFT SHOULDER: ICD-10-CM

## 2022-09-01 PROCEDURE — 97140 MANUAL THERAPY 1/> REGIONS: CPT | Performed by: PHYSICAL THERAPIST

## 2022-09-01 PROCEDURE — 97110 THERAPEUTIC EXERCISES: CPT | Performed by: PHYSICAL THERAPIST

## 2022-09-01 NOTE — PROGRESS NOTES
Daily Note     Today's date: 2022  Patient name: Silvio Yeung  : 1945  MRN: [id number]  Referring provider: Iveth Garcia*  Dx:   Encounter Diagnosis     ICD-10-CM    1  Chronic left shoulder pain  M25 512     G89 29    2  Localized osteoarthritis of left shoulder  M19 012                   Subjective: Yunier Rodarte reports having some discomfort in left shld but nothing serious  Objective: See treatment diary below      Assessment: Tolerated treatment well  Patient would benefit from continued PT  Added scap squeeze to HEP  Plan: Continue per plan of care  Progress treatment as tolerated  Eval/ Re-eval Auth #/ Referral # Total visits Start date  Expiration date Total active units  Total manual units  PT only or  PT+OT?      IE                                     Date:          Total units authorized  Active:            Manual:           Total units remaining  Active:               Manual:                Date:           Total units authorized  Active:            Manual:           Total units remaining  Active:               Manual:               Precautions:  Significant LBP and history, 2 stents placed in , Hypertension,  Left TSA 22,      TSA protocol    Specialty Daily Treatment Diary       Manuals 22      Visit # 1 2      STM UT, pec group  3'      PROM shld  12'      ST joint mobs                Warm-up  10 min              Neuro Re-Ed        Posture correct        Scap squeeze  20                              Ther Ex        Gripping  20      Wrist flex / ext 20 reviewed      Pendulums 20 reviewed                                              Ther Activity                                Modalities        CP

## 2022-09-06 ENCOUNTER — OFFICE VISIT (OUTPATIENT)
Dept: PHYSICAL THERAPY | Facility: CLINIC | Age: 77
End: 2022-09-06
Payer: COMMERCIAL

## 2022-09-06 DIAGNOSIS — G89.29 CHRONIC LEFT SHOULDER PAIN: ICD-10-CM

## 2022-09-06 DIAGNOSIS — M25.512 CHRONIC LEFT SHOULDER PAIN: ICD-10-CM

## 2022-09-06 DIAGNOSIS — M19.012 LOCALIZED OSTEOARTHRITIS OF LEFT SHOULDER: Primary | ICD-10-CM

## 2022-09-06 PROCEDURE — 97140 MANUAL THERAPY 1/> REGIONS: CPT | Performed by: PHYSICAL THERAPIST

## 2022-09-06 PROCEDURE — 97110 THERAPEUTIC EXERCISES: CPT | Performed by: PHYSICAL THERAPIST

## 2022-09-06 NOTE — PROGRESS NOTES
Daily Note     Today's date: 2022  Patient name: Trinity Peter  : 1945  MRN: [id number]  Referring provider: Lee Rodríguez*  Dx:   Encounter Diagnosis     ICD-10-CM    1  Localized osteoarthritis of left shoulder  M19 012    2  Chronic left shoulder pain  M25 512     G89 29                   Subjective:   Hollis Blake notes some biceps soreness over the weekend  He denies trauma and has been compliant with his sling  Objective: See treatment diary below      Assessment: Tolerated treatment well  Patient would benefit from continued PT  Discussed with patient that his arm may have soreness from lack of mobility due to necessity of wearing the sling  He voiced understanding this  Plan: Continue per plan of care  Progress treatment as tolerated  Eval/ Re-eval Auth #/ Referral # Total visits Start date  Expiration date Total active units  Total manual units  PT only or  PT+OT?      IE                                     Date:         Total units authorized  Active:            Manual:           Total units remaining  Active:               Manual:                Date:           Total units authorized  Active:            Manual:           Total units remaining  Active:               Manual:               Precautions:  Significant LBP and history, 2 stents placed in , Hypertension,  Left TSA 22,      TSA protocol    Specialty Daily Treatment Diary       Manuals 22     Visit # 1 2 3     STM UT, pec group  3' 3'     PROM shld  12' 9'     ST joint mobs                Warm-up  10 min 10 min             Neuro Re-Ed        Posture correct        Scap squeeze  20 30                             Ther Ex        Gripping  20 30     Wrist flex / ext 20 reviewed      Pendulums 20 reviewed 20                                             Ther Activity                                Modalities        CP

## 2022-09-07 ENCOUNTER — OFFICE VISIT (OUTPATIENT)
Dept: PHYSICAL THERAPY | Facility: CLINIC | Age: 77
End: 2022-09-07
Payer: COMMERCIAL

## 2022-09-07 DIAGNOSIS — M25.512 CHRONIC LEFT SHOULDER PAIN: ICD-10-CM

## 2022-09-07 DIAGNOSIS — M19.012 LOCALIZED OSTEOARTHRITIS OF LEFT SHOULDER: Primary | ICD-10-CM

## 2022-09-07 DIAGNOSIS — G89.29 CHRONIC LEFT SHOULDER PAIN: ICD-10-CM

## 2022-09-07 PROCEDURE — 97140 MANUAL THERAPY 1/> REGIONS: CPT | Performed by: PHYSICAL THERAPIST

## 2022-09-07 PROCEDURE — 97110 THERAPEUTIC EXERCISES: CPT | Performed by: PHYSICAL THERAPIST

## 2022-09-07 NOTE — PROGRESS NOTES
Daily Note     Today's date: 2022  Patient name: Juaquin Orellana  : 1945  MRN: [id number]  Referring provider: Gustavo Britton*  Dx:   Encounter Diagnosis     ICD-10-CM    1  Localized osteoarthritis of left shoulder  M19 012    2  Chronic left shoulder pain  M25 512     G89 29                   Subjective:   Wilda Spears has some soreness today      Objective: See treatment diary below      Assessment: Tolerated treatment well  Patient would benefit from continued PT  He is making regular ROM progress each session with ER and flexion  Plan: Continue per plan of care  Progress treatment as tolerated  Teach self PROM with cane  Eval/ Re-eval Auth #/ Referral # Total visits Start date  Expiration date Total active units  Total manual units  PT only or  PT+OT?      IE                                     Date:        Total units authorized  Active:            Manual:           Total units remaining  Active:               Manual:                Date:           Total units authorized  Active:            Manual:           Total units remaining  Active:               Manual:               Precautions:  Significant LBP and history, 2 stents placed in , Hypertension,  Left TSA 22,      TSA protocol    Specialty Daily Treatment Diary       Manuals 22    Visit # 1 2 3 4    STM UT, pec group  3' 3' 3'    PROM shld  12' 9' 9'    ST joint mobs                Warm-up  10 min 10 min 10 min    MH        Neuro Re-Ed        Posture correct        Scap squeeze  20 30 30                            Ther Ex        Gripping  20 30 30    Wrist flex / ext 20 reviewed      Pendulums 20 reviewed 20 30                                            Ther Activity                                Modalities        CP

## 2022-09-13 ENCOUNTER — OFFICE VISIT (OUTPATIENT)
Dept: PHYSICAL THERAPY | Facility: CLINIC | Age: 77
End: 2022-09-13
Payer: COMMERCIAL

## 2022-09-13 DIAGNOSIS — G89.29 CHRONIC LEFT SHOULDER PAIN: Primary | ICD-10-CM

## 2022-09-13 DIAGNOSIS — M19.012 LOCALIZED OSTEOARTHRITIS OF LEFT SHOULDER: ICD-10-CM

## 2022-09-13 DIAGNOSIS — M25.512 CHRONIC LEFT SHOULDER PAIN: Primary | ICD-10-CM

## 2022-09-13 PROCEDURE — 97110 THERAPEUTIC EXERCISES: CPT | Performed by: PHYSICAL THERAPIST

## 2022-09-13 PROCEDURE — 97140 MANUAL THERAPY 1/> REGIONS: CPT | Performed by: PHYSICAL THERAPIST

## 2022-09-13 NOTE — PROGRESS NOTES
Daily Note     Today's date: 2022  Patient name: Kelley Alvarado  : 1945  MRN: [id number]  Referring provider: Ubaldo Morataya*  Dx:   Encounter Diagnosis     ICD-10-CM    1  Chronic left shoulder pain  M25 512     G89 29    2  Localized osteoarthritis of left shoulder  M19 012                   Subjective: Pt reports that he has about 5/10 pain in shoulder  He reports there is generally always pain  He also reports that he is likely planning on changing to a closer clinic to his home to aide in his transportation  No change in pain following today's session  Objective: See treatment diary below  Firm end feel flexion PROM      Assessment: Tolerated treatment well  Patient demo ongoing limitations with pain and guarding with efforts to perform pendulums  Generally requiring ongoing assistance to don sling, not to remove  Plan: Continue per plan of care  Aurelia العراقي Possible transfer to [facility] office in next 2 weeks  Eval/ Re-eval Auth #/ Referral # Total visits Start date  Expiration date Total active units  Total manual units  PT only or  PT+OT?      IE                                     Date:       Total units authorized  Active:            Manual:           Total units remaining  Active:               Manual:                Date:           Total units authorized  Active:            Manual:           Total units remaining  Active:               Manual:               Precautions:  Significant LBP and history, 2 stents placed in , Hypertension,  Left TSA 22,      TSA protocol    Specialty Daily Treatment Diary       Manuals 22   Visit # 1 2 3 4 5   STM UT, pec group  3' 3' 3'    PROM shld  12' 9' 9'    ST joint mobs                Warm-up  10 min 10 min 10 min 10'           Neuro Re-Ed        Posture correct        Scap squeeze  20 30 30 30x                           Ther Ex        Gripping  20 30 30 30x   Wrist flex / ext 20 reviewed      Pendulums 20 reviewed 20 30 30x                                           Ther Activity                                Modalities        CP

## 2022-09-15 ENCOUNTER — OFFICE VISIT (OUTPATIENT)
Dept: PHYSICAL THERAPY | Facility: CLINIC | Age: 77
End: 2022-09-15
Payer: COMMERCIAL

## 2022-09-15 DIAGNOSIS — G89.29 CHRONIC LEFT SHOULDER PAIN: Primary | ICD-10-CM

## 2022-09-15 DIAGNOSIS — M25.512 CHRONIC LEFT SHOULDER PAIN: Primary | ICD-10-CM

## 2022-09-15 DIAGNOSIS — M19.012 LOCALIZED OSTEOARTHRITIS OF LEFT SHOULDER: ICD-10-CM

## 2022-09-15 PROCEDURE — 97140 MANUAL THERAPY 1/> REGIONS: CPT | Performed by: PHYSICAL THERAPIST

## 2022-09-15 PROCEDURE — 97110 THERAPEUTIC EXERCISES: CPT | Performed by: PHYSICAL THERAPIST

## 2022-09-15 NOTE — PROGRESS NOTES
Daily Note     Today's date: 9/15/2022  Patient name: Lauro Dakin  : 1945  MRN: [id number]  Referring provider: Génesis Lyle*  Dx:   Encounter Diagnosis     ICD-10-CM    1  Chronic left shoulder pain  M25 512     G89 29    2  Localized osteoarthritis of left shoulder  M19 012                   Subjective: Pt reports continued soreness left shoulder  Objective: See treatment diary below  Firm end feel flexion PROM      Assessment: Tolerated treatment well  He was educated on performing self flexion and ER PROM  He agreed to add these to HEP  He also agreed to stop PROM for feeling of  increased pain or for increased soft tissue restriction  Plan: Continue per plan of care     Transfer to [place], [place] office after next week  Eval/ Re-eval Auth #/ Referral # Total visits Start date  Expiration date Total active units  Total manual units  PT only or  PT+OT?      IE                                     Date: 8/30 9/1 9/6 9/7 9/13 9/15     Total units authorized  Active:            Manual:           Total units remaining  Active:               Manual:                Date:           Total units authorized  Active:            Manual:           Total units remaining  Active:               Manual:               Precautions:  Significant LBP and history, 2 stents placed in , Hypertension,  Left TSA 22,      TSA protocol    Specialty Daily Treatment Diary       Manuals 9/1/22 9/6/22 9/7/22 9/13/22 9/15/22   Visit # 2 3 4 5 6 - foto   STM UT, pec group 3' 3' 3'  4'   PROM shld 12' 9' 9'  10'   ST joint mobs                Warm-up 10 min 10 min 10 min 10' 10'  [address]   MH        Neuro Re-Ed        Posture correct        Scap squeeze 20 30 30 30x 30                           Ther Ex        Gripping 20 30 30 30x    Wrist flex / ext reviewed       Pendulums reviewed 20 30 30x 30   Wand flex     12 supine   Wand ER     12 supine                           Ther Activity Modalities        CP     --

## 2022-09-20 ENCOUNTER — OFFICE VISIT (OUTPATIENT)
Dept: PHYSICAL THERAPY | Facility: CLINIC | Age: 77
End: 2022-09-20
Payer: COMMERCIAL

## 2022-09-20 DIAGNOSIS — M19.012 LOCALIZED OSTEOARTHRITIS OF LEFT SHOULDER: ICD-10-CM

## 2022-09-20 DIAGNOSIS — G89.29 CHRONIC LEFT SHOULDER PAIN: Primary | ICD-10-CM

## 2022-09-20 DIAGNOSIS — M25.512 CHRONIC LEFT SHOULDER PAIN: Primary | ICD-10-CM

## 2022-09-20 PROCEDURE — 97140 MANUAL THERAPY 1/> REGIONS: CPT | Performed by: PHYSICAL THERAPIST

## 2022-09-20 PROCEDURE — 97110 THERAPEUTIC EXERCISES: CPT | Performed by: PHYSICAL THERAPIST

## 2022-09-20 NOTE — PROGRESS NOTES
Daily Note     Today's date: 2022  Patient name: Liza Carty  : 1945  MRN: [id number]  Referring provider: Ramiro Sánchez*  Dx:   Encounter Diagnosis     ICD-10-CM    1  Chronic left shoulder pain  M25 512     G89 29    2  Localized osteoarthritis of left shoulder  M19 012                   Subjective: Pt reports continued soreness left shoulder  He has performed HEP 2 times daily but is unable to perform more due to his recent moving  Objective: See treatment diary below  Assessment: Tolerated treatment well  He had ER to neutral today  He has difficulty with relaxing completely  Performed abduction PROM which was initially tight then loosened to 80 degrees over time  Performed PROM to ER again and he had 20 degrees of ER  He agreed to add cane abduction stretch to HEP and to perform HEP more frequently  Plan: Continue per plan of care     Transfer to [place], [place] office after next visit  Eval/ Re-eval Auth #/ Referral # Total visits Start date  Expiration date Total active units  Total manual units  PT only or  PT+OT?      IE                                     Date: 8/30 9/1 9/6 9/7 9/13 9/15 9/20    Total units authorized  Active:            Manual:           Total units remaining  Active:               Manual:                Date:           Total units authorized  Active:            Manual:           Total units remaining  Active:               Manual:               Precautions:  Significant LBP and history, 2 stents placed in , Hypertension,  Left TSA 22,      TSA protocol    Specialty Daily Treatment Diary       Manuals 9/6/22 9/7/22 9/13/22 9/15/22 9/20/22   Visit # 3 4 5 6 - foto 7   STM UT, pec group 3' 3'  4' 4'10'   PROM shld 9' 9'  10' 15'   ST joint mobs                Warm-up 10 min 10 min 10' 10'  [address]    MH     8' [address]  pre   Neuro Re-Ed        Posture correct        Scap squeeze 30 30 30x 30 30                           Ther Ex Gripping 30 30 30x     Wrist flex / ext        Pendulums 20 30 30x 30 30   Wand flex    12 supine 15 supine   Wand ER    12 supine 15 supine   Wand abd     15 stand   Elbow flex / ext     20           Ther Activity                                Modalities        CP    --

## 2022-09-22 ENCOUNTER — OFFICE VISIT (OUTPATIENT)
Dept: PHYSICAL THERAPY | Facility: CLINIC | Age: 77
End: 2022-09-22
Payer: COMMERCIAL

## 2022-09-22 DIAGNOSIS — G89.29 CHRONIC LEFT SHOULDER PAIN: ICD-10-CM

## 2022-09-22 DIAGNOSIS — M19.012 LOCALIZED OSTEOARTHRITIS OF LEFT SHOULDER: Primary | ICD-10-CM

## 2022-09-22 DIAGNOSIS — M25.512 CHRONIC LEFT SHOULDER PAIN: ICD-10-CM

## 2022-09-22 PROCEDURE — 97110 THERAPEUTIC EXERCISES: CPT | Performed by: PHYSICAL THERAPIST

## 2022-09-22 PROCEDURE — 97140 MANUAL THERAPY 1/> REGIONS: CPT | Performed by: PHYSICAL THERAPIST

## 2022-09-22 NOTE — PROGRESS NOTES
Daily Note     Today's date: 2022  Patient name: Andrea Koo  : 1945  MRN: [id number]  Referring provider: Navneet Puente*  Dx:   Encounter Diagnosis     ICD-10-CM    1  Localized osteoarthritis of left shoulder  M19 012    2  Chronic left shoulder pain  M25 512     G89 29                   Subjective: Pt reports 4/10 pain today  Objective: See treatment diary below  Assessment: Tolerated treatment well  Taught patient 4 way isometrics this session  Phoebe Pichardo was given an updated HEP  He performed shoulder pulleys this session but this was not given as HEP at this point until he is more relaxed performing the exercise  Plan: Continue per plan of care     Transfer to [place], [place] office     Marilyal/ Naomie Deaner #/ Referral # Total visits Start date  Expiration date Total active units  Total manual units  PT only or  PT+OT?      IE                                     Date: 8/30 9/1 9/6 9/7 9/13 9/15 9/20 9/22/22   Total units authorized  Active:            Manual:           Total units remaining  Active:               Manual:                Date:           Total units authorized  Active:            Manual:           Total units remaining  Active:               Manual:               Precautions:  Significant LBP and history, 2 stents placed in , Hypertension,  Left TSA 22,      TSA protocol    Specialty Daily Treatment Diary       Manuals 9/7/22 9/13/22 9/15/22 9/20/22 9/22/22   Visit # 4 5 6 - foto 7 8   STM UT, pec group 3'  4' 4' 4'   PROM shld 9'  10' 15' 10'   ST joint mobs                Warm-up 10 min 10' 10'  [address]     MH    [address]' [address]  pre 8' [address]   Neuro Re-Ed        Posture correct        Scap squeeze 30 30x 30 30 30   Isomets 4 way     10x  5" hold   Pulleys     10x for flexion           Ther Ex        Gripping 30 30x      Pendulums 30 30x 30 30 Arm hangs   Wand flex   12 supine 15 supine 10   Wand ER   12 supine 15 supine 10   Wand abd    15 stand 15 stand   Elbow flex / ext    20 20           Ther Activity                                Modalities        CP   --                  Access Code: Rakesh Ritchie: [email or web address]  Date: 09/22/2022  Prepared by:  Max Fraire    Exercises  · Supine Shoulder Flexion Extension AAROM with Dowel - 3 x daily - 2 sets - 10 reps  · Supine Shoulder External Rotation in 45 Degrees Abduction AAROM with Dowel - 3 x daily - 2 sets - 10 reps  · Standing Shoulder Abduction AAROM with Dowel - 3 x daily - 2 sets - 10 reps  · Seated Elbow Flexion and Extension AROM - 3 x daily - 2 sets - 10 reps  · Isometric Shoulder Flexion at Wall - 3 x daily - 1 sets - 10 reps - 5 hold  · Isometric Shoulder Extension at Wall - 3 x daily - 1 sets - 10 reps - 5 hold  · Isometric Shoulder External Rotation at Wall - 3 x daily - 1 sets - 10 reps - 5 hold  · Standing Isometric Shoulder Internal Rotation at Doorway - 3 x daily - 1 sets - 10 reps - 5 hold

## 2022-09-27 ENCOUNTER — EVALUATION (OUTPATIENT)
Dept: PHYSICAL THERAPY | Facility: CLINIC | Age: 77
End: 2022-09-27
Payer: COMMERCIAL

## 2022-09-27 ENCOUNTER — APPOINTMENT (OUTPATIENT)
Dept: PHYSICAL THERAPY | Facility: CLINIC | Age: 77
End: 2022-09-27
Payer: COMMERCIAL

## 2022-09-27 DIAGNOSIS — M25.512 CHRONIC LEFT SHOULDER PAIN: ICD-10-CM

## 2022-09-27 DIAGNOSIS — G89.29 CHRONIC LEFT SHOULDER PAIN: ICD-10-CM

## 2022-09-27 DIAGNOSIS — M19.012 LOCALIZED OSTEOARTHRITIS OF LEFT SHOULDER: Primary | ICD-10-CM

## 2022-09-27 PROCEDURE — 97110 THERAPEUTIC EXERCISES: CPT

## 2022-09-27 NOTE — PROGRESS NOTES
PT Re-Evaluation     Today's date: 2022  Patient name: [facility]  : 1945  MRN: [id number]  Referring provider: Ernesto Guajardo*  Dx:   Encounter Diagnosis     ICD-10-CM    1  Localized osteoarthritis of left shoulder  M19 012    2  Chronic left shoulder pain  M25 512     G89 29        Start Time: 0850  Stop Time: 0930  Total time in clinic (min): 40 minutes    Assessment  Assessment details: [facility] is a 76 y o  male who presents to skilled OPPT for his 9th visit, 1st at this location, s/p L total shoulder arthroplasty with biceps tenodesis  Pt presents with decreased pain, improved LUE PROM, increased muscle activation of the LUE, impaired function, decreased activity tolerance and poor posture  Pt continues to guard throughout PROM exercises and manual stretching which limits his gains, however is still making progress  Pt was re-educated on his POC and HEP  Pt continues with high levels of GH restriction, pain, and limited strength  Pt's pain and deficits are preventing him from performing self care, ADL's, and recreational activities without help or compensations  Pt would continue to benefit from skilled physical therapy services to decrease his pain, address his deficits, progress towards his goals, progress through his post-surgical protocol, and return to his PLOF  Impairments: abnormal muscle firing, abnormal muscle tone, abnormal or restricted ROM, abnormal movement, activity intolerance, impaired physical strength, pain with function, scapular dyskinesis, poor posture  and poor body mechanics  Understanding of Dx/Px/POC: good   Prognosis: good    Goals  Short term goals:  (6 weeks)  1  Patient will have pain level 2/10 left shoulder at rest- Progressing  2  Patient will report a 50% improvement in symptoms with ADL's- Progressing  3  Patient will demonstrate appropriate scapulohumeral rhythm with reaching shoulder height and below- Progressing  4   Patient will have improved left shoulder ROM by 20 degrees- Progressing    Long term goals: (12 weeks)  1  Patient will report 85 % improvement improvement in symptoms with ADL's- Progressing  2  Patient will have pain level 2/10 left shoulder with ADL's - Progressing  3  Patient will demonstrate appropriate scapulohumeral rhythm with reaching overhead- Progressing  4  Patient will be independent in a comprehensive home exercise program- Progressing      Plan  Patient would benefit from: PT eval and skilled physical therapy  Planned modality interventions: cryotherapy and thermotherapy: hydrocollator packs  Planned therapy interventions: joint mobilization, manual therapy, massage, neuromuscular re-education, patient education, postural training, strengthening, stretching, therapeutic activities, ADL training, functional ROM exercises, home exercise program, abdominal trunk stabilization and therapeutic exercise  Frequency: 2x week  Duration in weeks: 12  Treatment plan discussed with: patient        Subjective Evaluation    History of Present Illness  Date of surgery: 8/18/2022  Mechanism of injury: surgery  Mechanism of injury: 9/27/22 Update:  Pt feels he is getting more movement and starting to feel a little better  Certain movements will give him a sharp pain, he gets pain after doing his exercises but doesn't last long  Pt wears his shoulder sling all of the time, takes it off to shower, get dressed, and when he does his exercises  Pt sleeps in his recliner, not able to sleep in bed  Sees Dr Parul Quinones for a follow-up next Wednesday  Pt is still not using his arm actively but isn't aware of any restrictions  Pt feels he is 40% improved since the surgery  IE:  He reports many year history of left shoulder pain  He has had PT and injections multiple times over that time frame  It became very difficult to even move his left arm about 6 months ago  He followed up with Dr Parul Quinones    He underwent left TSA almost 2 weeks ago  He has now been referred to PT  Pain  Current pain rating: 3  At best pain ratin  At worst pain ratin  Location: Anterior L shoulder, down the biceps  Quality: sharp and dull ache  Relieving factors: ice, rest and medications  Aggravating factors: lifting and overhead activity  Progression: improved    Social Support  Lives with: spouse    Employment status: not working (retired)  Hand dominance: right  Exercise history: Walking    Patient Goals  Patient goals for therapy: decreased pain and independence with ADLs/IADLs  Patient's goals regarding treatment: Driving  Objective     Postural Observations  Seated posture: fair    Additional Postural Observation Details  He is wearing an abduction pillow sling on left arm  Palpation   Left   Hypertonic in the pectoralis minor and upper trapezius  Tenderness of the pectoralis minor and upper trapezius  Active Range of Motion     Left Elbow   Flexion: 125 degrees   Extension: -25 degrees   Forearm supination: 73 degrees with pain  Forearm pronation: 88 degrees     Passive Range of Motion   Left Shoulder   Flexion: 85 degrees   External rotation 0°: 5 degrees   Internal rotation 0°: 50 degrees     Right Shoulder   Flexion: 145 degrees   Abduction: 140 degrees   External rotation 0°: 72 degrees   Internal rotation 0°: 85 degrees     Scapular Mobility   Left Shoulder   Scapular mobility: fair    Strength/Myotome Testing     Right Shoulder     Planes of Motion   Flexion: 4+   Abduction: 4+   External rotation at 0°: 4+   Internal rotation at 0°: 4+     Additional Strength Details  Left MMT was not assessed today               Eval/ Re-eval Auth #/ Referral # Total visits Start date  Expiration date Total active units  Total manual units  PT only or  PT+OT?      IE                                     Date: 8/30 9/1 9/6 9/7 9/13 9/15 9/20 9/22/22   Total units authorized  Active:            Manual:           Total units remaining  Active: Manual:                Date:           Total units authorized  Active:            Manual:           Total units remaining  Active:               Manual:               Precautions:  Significant LBP and history, 2 stents placed in 2020, Hypertension,  Left TSA 8/18/22,      TSA protocol    Specialty Daily Treatment Diary       Manuals 9/27/22 9/13/22 9/15/22 9/20/22 9/22/22   Visit # 9 5 6 - foto 7 8   STM UT, pec group   4' 4' 4'   PROM shld   10' 15' 10'   ST joint mobs                Warm-up  10' 10'  [address]     MH    [address]' [address]  pre [address]' [address]   Neuro Re-Ed        Posture correct        Scap squeeze  30x 30 30 30   Isomets 4 way     10x  5" hold   Pulleys     10x for flexion           Ther Ex        Gripping  30x      Pendulums  30x 30 30 Arm hangs   Wand flex   12 supine 15 supine 10   Wand ER   12 supine 15 supine 10   Wand abd    15 stand 15 stand   Elbow flex / ext    20 20           Ther Activity                                Modalities        CP   --                  Access Code: [email or web address]  URL: [email or web address]  Date: 09/22/2022  Prepared by:  Nava Hernandez    Exercises  · Supine Shoulder Flexion Extension AAROM with Dowel - 3 x daily - 2 sets - 10 reps  · Supine Shoulder External Rotation in 45 Degrees Abduction AAROM with Dowel - 3 x daily - 2 sets - 10 reps  · Standing Shoulder Abduction AAROM with Dowel - 3 x daily - 2 sets - 10 reps  · Seated Elbow Flexion and Extension AROM - 3 x daily - 2 sets - 10 reps  · Isometric Shoulder Flexion at Wall - 3 x daily - 1 sets - 10 reps - 5 hold  · Isometric Shoulder Extension at Wall - 3 x daily - 1 sets - 10 reps - 5 hold  · Isometric Shoulder External Rotation at Wall - 3 x daily - 1 sets - 10 reps - 5 hold  · Standing Isometric Shoulder Internal Rotation at Doorway - 3 x daily - 1 sets - 10 reps - 5 hold

## 2022-09-29 ENCOUNTER — OFFICE VISIT (OUTPATIENT)
Dept: PHYSICAL THERAPY | Facility: CLINIC | Age: 77
End: 2022-09-29
Payer: COMMERCIAL

## 2022-09-29 ENCOUNTER — APPOINTMENT (OUTPATIENT)
Dept: PHYSICAL THERAPY | Facility: CLINIC | Age: 77
End: 2022-09-29
Payer: COMMERCIAL

## 2022-09-29 DIAGNOSIS — G89.29 CHRONIC LEFT SHOULDER PAIN: ICD-10-CM

## 2022-09-29 DIAGNOSIS — M19.012 LOCALIZED OSTEOARTHRITIS OF LEFT SHOULDER: Primary | ICD-10-CM

## 2022-09-29 DIAGNOSIS — M25.512 CHRONIC LEFT SHOULDER PAIN: ICD-10-CM

## 2022-09-29 PROCEDURE — 97110 THERAPEUTIC EXERCISES: CPT

## 2022-09-29 PROCEDURE — 97140 MANUAL THERAPY 1/> REGIONS: CPT

## 2022-09-29 PROCEDURE — 97112 NEUROMUSCULAR REEDUCATION: CPT

## 2022-09-29 NOTE — PROGRESS NOTES
Daily Note     Today's date: 2022  Patient name: Jef Sands  : 1945  MRN: [id number]  Referring provider: Katelin Kaplan*  Dx:   Encounter Diagnosis     ICD-10-CM    1  Localized osteoarthritis of left shoulder  M19 012    2  Chronic left shoulder pain  M25 512     G89 29        Start Time: 0845  Stop Time: 0945  Total time in clinic (min): 60 minutes    Subjective: Pt reports that his shoulder was sore after his re-evaluation but felt better the next day  Pt states he tried to do the table slides and cane flexion in supine but both were bothering him, performed his HEP 1x this morning and took pain meds before coming in  3/10 pain prior to the start of his treatment session  Objective: See treatment diary below      Assessment: Tolerated treatment well  Patient demonstrated fatigue post treatment, exhibited good technique with therapeutic exercises and would benefit from continued PT  Increased reps on all of his PROM exercises with at least 5 second holds to continue progressing his overall ROM  Pt given VC throughout to maintain upright posture and get more scapular recruitment for scap squeezes throughout session  Plan: Continue per plan of care  Progress treatment as tolerated  Eval/ Re-eval Auth #/ Referral # Total visits Start date  Expiration date Total active units  Total manual units  PT only or  PT+OT?      IE                                     Date: 8/30 9/1 9/6 9/7 9/13 9/15 9/20 9/22/22   Total units authorized  Active:            Manual:           Total units remaining  Active:               Manual:                Date:           Total units authorized  Active:            Manual:           Total units remaining  Active:               Manual:               Precautions:  Significant LBP and history, 2 stents placed in , Hypertension,  Left TSA 22,      TSA protocol    Specialty Daily Treatment Diary       Manuals 9/29 9/27/22 9/15/22 9/20/22 9/22/22   Visit #  9 6 - foto 7 8   STM UT, pec group   4' 4' 4'   PROM shld All directions  10' 15' 10'   ST joint mobs                Warm-up   10'  [address]     MH    8' MH  pre 8' [address]   Neuro Re-Ed        Posture correct addressed       Scap squeeze 30x  30 30 30   Isomets 4 way 20x 4 ways    10x  5" hold   Pulleys 10x flex    10x for flexion           Ther Ex        Gripping Towel 20x5s       Pendulums 30x AP/ML  30 30 Arm hangs   Wand flex   12 supine 15 supine 10   Wand ER Supine 30x5s  12 supine 15 supine 10   Wand abd Standing 20x5s   15 stand 15 stand   Elbow sup/pron 2x10       Elbow flex / ext 2x10   20 20   Table slides Flex 2x10       Ther Activity                                Modalities        CP   --                  Access Code: [email or web address]  URL: [email or web address]  Date: 09/22/2022  Prepared by:  Tony Salguero    Exercises  · Supine Shoulder Flexion Extension AAROM with Dowel - 3 x daily - 2 sets - 10 reps  · Supine Shoulder External Rotation in 45 Degrees Abduction AAROM with Dowel - 3 x daily - 2 sets - 10 reps  · Standing Shoulder Abduction AAROM with Dowel - 3 x daily - 2 sets - 10 reps  · Seated Elbow Flexion and Extension AROM - 3 x daily - 2 sets - 10 reps  · Isometric Shoulder Flexion at Wall - 3 x daily - 1 sets - 10 reps - 5 hold  · Isometric Shoulder Extension at Wall - 3 x daily - 1 sets - 10 reps - 5 hold  · Isometric Shoulder External Rotation at Wall - 3 x daily - 1 sets - 10 reps - 5 hold  · Standing Isometric Shoulder Internal Rotation at Doorway - 3 x daily - 1 sets - 10 reps - 5 hold

## 2022-10-04 ENCOUNTER — OFFICE VISIT (OUTPATIENT)
Dept: PHYSICAL THERAPY | Facility: CLINIC | Age: 77
End: 2022-10-04
Payer: COMMERCIAL

## 2022-10-04 DIAGNOSIS — M19.012 LOCALIZED OSTEOARTHRITIS OF LEFT SHOULDER: Primary | ICD-10-CM

## 2022-10-04 DIAGNOSIS — M25.512 CHRONIC LEFT SHOULDER PAIN: ICD-10-CM

## 2022-10-04 DIAGNOSIS — G89.29 CHRONIC LEFT SHOULDER PAIN: ICD-10-CM

## 2022-10-04 PROCEDURE — 97140 MANUAL THERAPY 1/> REGIONS: CPT

## 2022-10-04 PROCEDURE — 97110 THERAPEUTIC EXERCISES: CPT

## 2022-10-04 NOTE — PROGRESS NOTES
Daily Note     Today's date: 10/4/2022  Patient name: Kristal Serrano  : 1945  MRN: [id number]  Referring provider: Madelin Giron*  Dx:   Encounter Diagnosis     ICD-10-CM    1  Localized osteoarthritis of left shoulder  M19 012    2  Chronic left shoulder pain  M25 512     G89 29        Start Time: 08  Stop Time: 09  Total time in clinic (min): 50 minutes    Subjective: Pt reports that his shoulder has been stiff over the weekend and after he performs his exercises he feels a constant ache in the front of his shoulder and down into his biceps, however it will go away after a few minutes  Pt denies any pain in his shoulder prior to the start of his treatment session, just tight  Objective: See treatment diary below      Assessment: Tolerated treatment well  Patient demonstrated fatigue post treatment, exhibited good technique with therapeutic exercises and would benefit from continued PT  Pt continues to be extremely tight with his PROM, however shows improvements with flexion and abduction, continues to guard toward end ranges  Pt is able to achieve 90 degrees of PROM flexion, and is close to 90 in abduction  ER limited at times to at most 20 degrees, IR limited to approximately 45-50 degrees depending on how much he guards  Pt was educated on the need to continue his HEP and frequently stretch throughout the day, get out of his sling when resting to get elbow out of constant flexion  Pt given AAROM press up in supine with cane, tolerated well with no increases in discomfort, only felt increased tightness  Plan: Continue per plan of care  Progress treatment as tolerated  Eval/ Re-eval Auth #/ Referral # Total visits Start date  Expiration date Total active units  Total manual units  PT only or  PT+OT?      IE                                    Precautions:  Significant LBP and history, 2 stents placed in , Hypertension,  Left TSA 22,      TSA protocol    Specialty Daily Treatment Diary       Manuals 10/4 9/29 9/27/22 9/15/22 9/20/22   Visit # 11 10 9 6 - foto 7   STM UT, pec group    4' 4'   PROM shld All directions All directions  10' 15'   ST joint mobs                Warm-up    10'  [facility]    MH     8' MH  pre   Neuro Re-Ed        Posture correct  addressed      Scap squeeze 20x 30x  30 30   Isomets 4 way  20x 4 ways      Pulleys 2 mins flex 10x flex              Ther Ex        Gripping  Towel 20x5s      Pendulums 30x AP/ML 30x AP/ML  30 30   Wand flex    12 supine 15 supine   Wand ER Supine 30x5s Supine 30x5s  12 supine 15 supine   Wand abd Standing 3x10, 5s Standing 20x5s   15 stand   Elbow sup/pron  2x10      Elbow flex / ext  2x10   20   Supine AA cane press-up 2x10       Table slides Flex 3x10 Flex 2x10      Ther Activity                                Modalities        CP    --                 Access Code: [email or web address]  URL: [email or web address]  Date: 09/22/2022  Prepared by:  Devendra Sepuvleda    Exercises  · Supine Shoulder Flexion Extension AAROM with Dowel - 3 x daily - 2 sets - 10 reps  · Supine Shoulder External Rotation in 45 Degrees Abduction AAROM with Dowel - 3 x daily - 2 sets - 10 reps  · Standing Shoulder Abduction AAROM with Dowel - 3 x daily - 2 sets - 10 reps  · Seated Elbow Flexion and Extension AROM - 3 x daily - 2 sets - 10 reps  · Isometric Shoulder Flexion at Wall - 3 x daily - 1 sets - 10 reps - 5 hold  · Isometric Shoulder Extension at Wall - 3 x daily - 1 sets - 10 reps - 5 hold  · Isometric Shoulder External Rotation at Wall - 3 x daily - 1 sets - 10 reps - 5 hold  · Standing Isometric Shoulder Internal Rotation at Doorway - 3 x daily - 1 sets - 10 reps - 5 hold

## 2022-10-05 ENCOUNTER — OFFICE VISIT (OUTPATIENT)
Dept: OBGYN CLINIC | Facility: CLINIC | Age: 77
End: 2022-10-05

## 2022-10-05 DIAGNOSIS — Z96.612 STATUS POST TOTAL REPLACEMENT OF LEFT SHOULDER: ICD-10-CM

## 2022-10-05 DIAGNOSIS — M19.012 PRIMARY OSTEOARTHRITIS OF LEFT SHOULDER: Primary | ICD-10-CM

## 2022-10-05 PROCEDURE — 99024 POSTOP FOLLOW-UP VISIT: CPT | Performed by: ORTHOPAEDIC SURGERY

## 2022-10-05 RX ORDER — AMLODIPINE BESYLATE 10 MG/1
TABLET ORAL
COMMUNITY
Start: 2022-08-29

## 2022-10-05 NOTE — PROGRESS NOTES
Assessment/Plan:  1  Primary osteoarthritis of left shoulder     2  Status post total replacement of left shoulder       Scribe Attestation    I,:  Whitney Josue am acting as a scribe while in the presence of the attending physician :       I,:  Cecilia Merrill MD personally performed the services described in this documentation    as scribed in my presence :           Dennis Conway upon examination and review of the physical therapy notes is doing well  His anterior scar is well healed  He will continue physical therapy as per protocol  I did note that he will be restricted from lifting anything greater than 2- 3 lbs  He may range his shoulder to tolerance  I did also remark that soreness after therapy is normal  He may use the sling while in crowed environments  Otherwise, he may discontinue the use of the sling  He may begin to drive at this point as he is out of the sling  Dennis Conway verbalized understanding and had no further questions  I will see him back 6 weeks for repeat clinical evaluation  The dorsal hand pain does appear to be nerve related  It is possible that is positional due to being immobilized in the sling  This should improve now that he is progressed out of the sling  However we will monitor this as he continues to progress to physical therapy  Subjective:   Valdez Iglesias is a 76 y o  male who presents for evaluation of his left shoulder  He is now 7 weeks status post left shoulder standard total shoulder arthroplasty and biceps tenodesis  He states he is doing well overall and is progressing with physical therapy  He states that he does experience intermittent pain discomfort into the anterior aspect of the shoulder as well as just distal to his shoulder anteriorly  He does also have complaints of pain into the dorsal aspect of his hand while sleeping at night  His hand pain is not noticeable during the day    He does not acknowledge swelling of the wrist and hand at this point  However, he did have significant swelling into the wrist and hand initially postoperatively  Overall, he is happy with his progress up to this point  Today he denies any distal paresthesias  Review of Systems   Constitutional: Negative for chills, fever and unexpected weight change  HENT: Negative for hearing loss, nosebleeds and sore throat  Eyes: Negative for pain, redness and visual disturbance  Respiratory: Negative for cough, shortness of breath and wheezing  Cardiovascular: Negative for chest pain, palpitations and leg swelling  Gastrointestinal: Negative for abdominal pain, nausea and vomiting  Endocrine: Negative for polyphagia and polyuria  Genitourinary: Negative for dysuria and hematuria  Musculoskeletal: Positive for arthralgias and myalgias  See HPI   Skin: Negative for rash and wound  Neurological: Negative for dizziness, numbness and headaches  Psychiatric/Behavioral: Negative for decreased concentration and suicidal ideas  The patient is not nervous/anxious            Past Medical History:   Diagnosis Date    Coronary artery disease     CPAP (continuous positive airway pressure) dependence     Hypercholesterolemia     Hypertension     Sleep apnea        Past Surgical History:   Procedure Laterality Date    BACK SURGERY      CARDIAC LOOP RECORDER      CORONARY STENT PLACEMENT      x 2    [place] RECONSTR TOTAL SHOULDER IMPLANT Left 8/18/2022    Procedure: LEFT SHOULDER STANDARD TOTAL SHOULDER ARTHROPLASTY AND BICEPS TENODESIS;  Surgeon: Echo Last MD;  [facility]: [facility];  Service: Orthopedics    SPINAL FUSION      L2, L3 and L4    SPINAL STIMULATOR PLACEMENT  09/2021       Family History   Problem Relation Age of Onset    No Known Problems Mother        Social History     Occupational History    Not on file   Tobacco Use    Smoking status: Former Smoker     Types: Cigarettes    Smokeless tobacco: Never Used   Substance and Sexual Activity    Alcohol use: Not Currently    Drug use: Yes     Types: Marijuana     Comment: Medical Marijuana    Sexual activity: Not on file         Current Outpatient Medications:     amLODIPine (NORVASC) 10 mg tablet, , Disp: , Rfl:     aspirin (ECOTRIN LOW STRENGTH) 81 mg EC tablet, Take 81 mg by mouth daily Last dose 8/11/22, Disp: , Rfl:     atorvastatin (LIPITOR) 40 mg tablet, daily at bedtime, Disp: , Rfl:     Azelastine HCl 137 MCG/SPRAY SOLN, , Disp: , Rfl:     latanoprost (XALATAN) 0 005 % ophthalmic solution, , Disp: , Rfl:     metoprolol succinate (TOPROL-XL) 25 mg 24 hr tablet, daily at bedtime Takes  1/2 tab, Disp: , Rfl:     oxybutynin (DITROPAN-XL) 10 MG 24 hr tablet, 10 mg  in the morning and 10 mg before bedtime  , Disp: , Rfl:     oxyCODONE (Roxicodone) 5 immediate release tablet, Take 1 tablet (5 mg total) by mouth every 4 (four) hours as needed for moderate pain for up to 20 doses Max Daily Amount: 30 mg, Disp: 20 tablet, Rfl: 0    pantoprazole (PROTONIX) 40 mg tablet, , Disp: , Rfl:     Prenatal Vit-Fe Fumarate-FA (M-Vit) tablet, Take 1 tablet by mouth daily, Disp: , Rfl:     No Known Allergies    Objective: There were no vitals filed for this visit  Left Shoulder Exam     Tenderness   The patient is experiencing tenderness in the biceps tendon  Other   Erythema: absent  Scars: present  Sensation: normal  Pulse: present     Comments:  Range of motion and strength testing due to total shoulder arthroplasty            Physical Exam  Vitals reviewed  HENT:      Head: Normocephalic and atraumatic  Eyes:      General:         Right eye: No discharge  Left eye: No discharge  Conjunctiva/sclera: Conjunctivae normal       Pupils: Pupils are equal, round, and reactive to light  Cardiovascular:      Rate and Rhythm: Normal rate  Pulmonary:      Effort: Pulmonary effort is normal  No respiratory distress     Musculoskeletal:      Cervical back: Normal range of motion and neck supple  Comments: As noted in HPI   Skin:     General: Skin is warm and dry  Neurological:      Mental Status: He is alert and oriented to person, place, and time  Psychiatric:         Mood and Affect: Mood normal          Behavior: Behavior normal          I have personally reviewed pertinent films in PACS and my interpretation is as follows: No images reviewed today      This document was created using speech voice recognition software  Grammatical errors, random word insertions, pronoun errors, and incomplete sentences are an occasional consequence of this system due to software limitations, ambient noise, and hardware issues  Any formal questions or concerns about content, text, or information contained within the body of this dictation should be directly addressed to the provider for clarification

## 2022-10-06 ENCOUNTER — OFFICE VISIT (OUTPATIENT)
Dept: PHYSICAL THERAPY | Facility: CLINIC | Age: 77
End: 2022-10-06
Payer: COMMERCIAL

## 2022-10-06 DIAGNOSIS — G89.29 CHRONIC LEFT SHOULDER PAIN: ICD-10-CM

## 2022-10-06 DIAGNOSIS — M25.512 CHRONIC LEFT SHOULDER PAIN: ICD-10-CM

## 2022-10-06 DIAGNOSIS — M19.012 LOCALIZED OSTEOARTHRITIS OF LEFT SHOULDER: Primary | ICD-10-CM

## 2022-10-06 PROCEDURE — 97112 NEUROMUSCULAR REEDUCATION: CPT

## 2022-10-06 PROCEDURE — 97110 THERAPEUTIC EXERCISES: CPT

## 2022-10-06 NOTE — PROGRESS NOTES
Daily Note     Today's date: 10/6/2022  Patient name: Luis Enrique Remy  : 1945  MRN: [id number]  Referring provider: Jose Florian*  Dx:   Encounter Diagnosis     ICD-10-CM    1  Localized osteoarthritis of left shoulder  M19 012    2  Chronic left shoulder pain  M25 512     G89 29        Start Time: 0800  Stop Time: 0849  Total time in clinic (min): 49 minutes    Subjective: Pt states that his shoulder is feeling pretty good today  Pt saw Dr Jossue Garzon for a follow-up and was told things are progressing well and that he can discharge his sling  Pt states he slept without his sling for the first time and was sore afterward  Objective: See treatment diary below      Assessment: Tolerated treatment well  Patient demonstrated fatigue post treatment, exhibited good technique with therapeutic exercises and would benefit from continued PT      Plan: Continue per plan of care  Progress treatment as tolerated  Eval/ Re-eval Auth #/ Referral # Total visits Start date  Expiration date Total active units  Total manual units  PT only or  PT+OT?      IE                                    Precautions:  Significant LBP and history, 2 stents placed in , Hypertension,  Left TSA 22,      TSA protocol    Specialty Daily Treatment Diary       Manuals 10/6 10/4 9/29 9/27/22 9/15/22   Visit # 12 11 10 9 6 - foto   STM UT, pec group     4'   PROM shld  All directions All directions  10'   ST joint mobs                Warm-up     10'  [facility]        Neuro Re-Ed        Posture correct   addressed     rows YTB 2x10       Scap squeeze  20x 30x  30   Isomets 4 way 20x 4 ways  20x 4 ways     Pulleys 2 mins flex 2 mins flex 10x flex             Ther Ex        Gripping   Towel 20x5s     Supine cane flex 2x10 AAROM       Pendulums 30x AP/ML 30x AP/ML 30x AP/ML  30   Wand flex     12 supine   Wand ER  Supine 30x5s Supine 30x5s  12 supine   Wand abd Standing 3x10, 5s Standing 3x10, 5s Standing 20x5s Elbow sup/pron   2x10     Elbow flex / ext   2x10     Supine AA cane press-up 3x10 2x10      Wall slides Flex 2x10       Table slides Flex 2x10 table  Ball on table 15x flex, 15x abd Flex 3x10 Flex 2x10     Ther Activity                                Modalities        CP     --                Access Code: Brenda Silva  URL: [email or web address]  Date: 09/22/2022  Prepared by:  Rachelle Chi    Exercises  · Supine Shoulder Flexion Extension AAROM with Dowel - 3 x daily - 2 sets - 10 reps  · Supine Shoulder External Rotation in 45 Degrees Abduction AAROM with Dowel - 3 x daily - 2 sets - 10 reps  · Standing Shoulder Abduction AAROM with Dowel - 3 x daily - 2 sets - 10 reps  · Seated Elbow Flexion and Extension AROM - 3 x daily - 2 sets - 10 reps  · Isometric Shoulder Flexion at Wall - 3 x daily - 1 sets - 10 reps - 5 hold  · Isometric Shoulder Extension at Wall - 3 x daily - 1 sets - 10 reps - 5 hold  · Isometric Shoulder External Rotation at Wall - 3 x daily - 1 sets - 10 reps - 5 hold  · Standing Isometric Shoulder Internal Rotation at Doorway - 3 x daily - 1 sets - 10 reps - 5 hold

## 2022-10-11 ENCOUNTER — OFFICE VISIT (OUTPATIENT)
Dept: PHYSICAL THERAPY | Facility: CLINIC | Age: 77
End: 2022-10-11
Payer: COMMERCIAL

## 2022-10-11 DIAGNOSIS — M25.512 CHRONIC LEFT SHOULDER PAIN: ICD-10-CM

## 2022-10-11 DIAGNOSIS — G89.29 CHRONIC LEFT SHOULDER PAIN: ICD-10-CM

## 2022-10-11 DIAGNOSIS — M19.012 LOCALIZED OSTEOARTHRITIS OF LEFT SHOULDER: Primary | ICD-10-CM

## 2022-10-11 PROCEDURE — 97110 THERAPEUTIC EXERCISES: CPT

## 2022-10-11 PROCEDURE — 97140 MANUAL THERAPY 1/> REGIONS: CPT

## 2022-10-11 NOTE — PROGRESS NOTES
Daily Note     Today's date: 10/11/2022  Patient name: Boy Zaragoza  : 1945  MRN: [id number]  Referring provider: Fareed Stanley*  Dx:   Encounter Diagnosis     ICD-10-CM    1  Localized osteoarthritis of left shoulder  M19 012    2  Chronic left shoulder pain  M25 512     G89 29        Start Time: 0848  Stop Time: 930  Total time in clinic (min): 42 minutes    Subjective: Pt reports that his arm was very sore yesterday after doing his exercises but feels better today  Pt notices increasing motion in his shoulder, increased ability to perform self care such as dressing himself  Pt denies any pain prior to the start of his session today, only soreness and tightness  Objective: See treatment diary below      Assessment: Tolerated treatment well  Patient demonstrated fatigue post treatment, exhibited good technique with therapeutic exercises and would benefit from continued PT      Plan: Continue per plan of care  Progress treatment as tolerated  Eval/ Re-eval Auth #/ Referral # Total visits Start date  Expiration date Total active units  Total manual units  PT only or  PT+OT?      IE                                    Precautions:  Significant LBP and history, 2 stents placed in , Hypertension,  Left TSA 22,      TSA protocol    Specialty Daily Treatment Diary       Manuals 10/11 10/6 10/4 9/29 9/27/22   Visit # 13 12 11 10 9   STM UT, pec group        PROM shld All directions  All directions All directions    ST joint mobs                Warm-up                Neuro Re-Ed        Posture correct    addressed    rows  YTB 2x10      Scap squeeze   20x 30x    Isomets 4 way  20x 4 ways  20x 4 ways    Pulleys 2 mins flex + abd 2 mins flex 2 mins flex 10x flex            Ther Ex        Gripping    Towel 20x5s    Supine cane flex  2x10 AAROM      Pendulums  30x AP/ML 30x AP/ML 30x AP/ML    Wand flex        Wand ER Supine 3x10  Supine 30x5s Supine 30x5s    Wand abd Standing 2x10 Standing 3x10, 5s Standing 3x10, 5s Standing 20x5s    Elbow sup/pron    2x10    Elbow flex / ext    2x10    Supine AA cane press-up  3x10 2x10     Wall slides Flex in doorway w/RUE assistance 2x10, 5s Flex 2x10      Standing B ER 2x10       Table slides Ball on table flex + abd 2x10 ea Flex 2x10 table  Ball on table 15x flex, 15x abd Flex 3x10 Flex 2x10    Ther Activity                                Modalities        CP                     Access Code: [email or web address]  URL: [email or web address]  Date: 09/22/2022  Prepared by:  Devendra Sepulveda    Exercises  · Supine Shoulder Flexion Extension AAROM with Dowel - 3 x daily - 2 sets - 10 reps  · Supine Shoulder External Rotation in 45 Degrees Abduction AAROM with Dowel - 3 x daily - 2 sets - 10 reps  · Standing Shoulder Abduction AAROM with Dowel - 3 x daily - 2 sets - 10 reps  · Seated Elbow Flexion and Extension AROM - 3 x daily - 2 sets - 10 reps  · Isometric Shoulder Flexion at Wall - 3 x daily - 1 sets - 10 reps - 5 hold  · Isometric Shoulder Extension at Wall - 3 x daily - 1 sets - 10 reps - 5 hold  · Isometric Shoulder External Rotation at Wall - 3 x daily - 1 sets - 10 reps - 5 hold  · Standing Isometric Shoulder Internal Rotation at Doorway - 3 x daily - 1 sets - 10 reps - 5 hold

## 2022-10-13 ENCOUNTER — APPOINTMENT (OUTPATIENT)
Dept: PHYSICAL THERAPY | Facility: CLINIC | Age: 77
End: 2022-10-13

## 2022-10-18 ENCOUNTER — OFFICE VISIT (OUTPATIENT)
Dept: PHYSICAL THERAPY | Facility: CLINIC | Age: 77
End: 2022-10-18
Payer: COMMERCIAL

## 2022-10-18 DIAGNOSIS — M19.012 LOCALIZED OSTEOARTHRITIS OF LEFT SHOULDER: Primary | ICD-10-CM

## 2022-10-18 DIAGNOSIS — M25.512 CHRONIC LEFT SHOULDER PAIN: ICD-10-CM

## 2022-10-18 DIAGNOSIS — G89.29 CHRONIC LEFT SHOULDER PAIN: ICD-10-CM

## 2022-10-18 PROCEDURE — 97110 THERAPEUTIC EXERCISES: CPT

## 2022-10-18 PROCEDURE — 97112 NEUROMUSCULAR REEDUCATION: CPT

## 2022-10-18 PROCEDURE — 97140 MANUAL THERAPY 1/> REGIONS: CPT

## 2022-10-18 NOTE — PROGRESS NOTES
Daily Note     Today's date: 10/18/2022  Patient name: Sol Chapa  : 1945  MRN: [id number]  Referring provider: Faustino Matthews*  Dx:   Encounter Diagnosis     ICD-10-CM    1  Localized osteoarthritis of left shoulder  M19 012    2  Chronic left shoulder pain  M25 512     G89 29        Start Time: 1230  Stop Time: 1315  Total time in clinic (min): 45 minutes    Subjective: Pt states that his shoulder continues to feel better and better each week  Pt states he is getting better motion and feels better using his shoulder  Any pain goes away after a little bit of time  Pt denies any pain in his shoulder prior to the start of his treatment session  Pt will get pain in the front of his shoulder if he uses it a lot or lifts within his restrictions, but will go away just like the other pain/discomfort  Objective: See treatment diary below      Assessment: Tolerated treatment well  Patient demonstrated fatigue post treatment, exhibited good technique with therapeutic exercises and would benefit from continued PT  ER/IR walkouts were added as a progression to work on light shoulder strengthening, pt given VC and TC to maintain upright posture and work on scap squeeze to avoid excessive biceps activation  Plan: Continue per plan of care  Progress treatment as tolerated  Eval/ Re-eval Auth #/ Referral # Total visits Start date  Expiration date Total active units  Total manual units  PT only or  PT+OT?      IE                                    Precautions:  Significant LBP and history, 2 stents placed in , Hypertension,  Left TSA 22,      TSA protocol    Specialty Daily Treatment Diary       Manuals 10/18 10/11 10/6 10/4 9/29   Visit # 14 13 12 11 10   STM UT, pec group        PROM shld All directions All directions  All directions All directions   ST joint mobs                Warm-up        MH        Neuro Re-Ed        Posture correct     addressed   rows Rows & extensions YTB 2x10 ea  YTB 2x10     Scap squeeze    20x 30x   Isomets 4 way ER/IR walkout 10x B  20x 4 ways  20x 4 ways   Pulleys 2 mins flex & abd 2 mins flex + abd 2 mins flex 2 mins flex 10x flex           Ther Ex        Gripping     Towel 20x5s   Supine cane flex   2x10 AAROM     Pendulums   30x AP/ML 30x AP/ML 30x AP/ML   Wand flex        Wand ER Supine 3x10 Supine 3x10  Supine 30x5s Supine 30x5s   Wand abd Standing 2x10 Standing 2x10 Standing 3x10, 5s Standing 3x10, 5s Standing 20x5s   Elbow sup/pron     2x10   Elbow flex / ext     2x10   Supine AA cane press-up   3x10 2x10    Wall slides  Flex in doorway w/RUE assistance 2x10, 5s Flex 2x10     Standing B ER 2x10 2x10      Table slides Ball on table flex 2x10 Ball on table flex + abd 2x10 ea Flex 2x10 table  Ball on table 15x flex, 15x abd Flex 3x10 Flex 2x10   Ther Activity                                Modalities        CP                     Access Code: [email or web address]  URL: [email or web address]  Date: 09/22/2022  Prepared by:  Emelina Winston    Exercises  · Supine Shoulder Flexion Extension AAROM with Dowel - 3 x daily - 2 sets - 10 reps  · Supine Shoulder External Rotation in 45 Degrees Abduction AAROM with Dowel - 3 x daily - 2 sets - 10 reps  · Standing Shoulder Abduction AAROM with Dowel - 3 x daily - 2 sets - 10 reps  · Seated Elbow Flexion and Extension AROM - 3 x daily - 2 sets - 10 reps  · Isometric Shoulder Flexion at Wall - 3 x daily - 1 sets - 10 reps - 5 hold  · Isometric Shoulder Extension at Wall - 3 x daily - 1 sets - 10 reps - 5 hold  · Isometric Shoulder External Rotation at Wall - 3 x daily - 1 sets - 10 reps - 5 hold  · Standing Isometric Shoulder Internal Rotation at Doorway - 3 x daily - 1 sets - 10 reps - 5 hold

## 2022-10-20 ENCOUNTER — OFFICE VISIT (OUTPATIENT)
Dept: PHYSICAL THERAPY | Facility: CLINIC | Age: 77
End: 2022-10-20
Payer: COMMERCIAL

## 2022-10-20 DIAGNOSIS — M19.012 LOCALIZED OSTEOARTHRITIS OF LEFT SHOULDER: Primary | ICD-10-CM

## 2022-10-20 DIAGNOSIS — M25.512 CHRONIC LEFT SHOULDER PAIN: ICD-10-CM

## 2022-10-20 DIAGNOSIS — G89.29 CHRONIC LEFT SHOULDER PAIN: ICD-10-CM

## 2022-10-20 PROCEDURE — 97110 THERAPEUTIC EXERCISES: CPT

## 2022-10-20 PROCEDURE — 97112 NEUROMUSCULAR REEDUCATION: CPT

## 2022-10-20 NOTE — PROGRESS NOTES
Daily Note     Today's date: 10/20/2022  Patient name: Summer Crooks  : 1945  MRN: [id number]  Referring provider: Santi Howard*  Dx:   Encounter Diagnosis     ICD-10-CM    1  Localized osteoarthritis of left shoulder  M19 012    2  Chronic left shoulder pain  M25 512     G89 29        Start Time: 0800  Stop Time: 0845  Total time in clinic (min): 45 minutes    Subjective: Pt states trying to transition to sleeping in the bed but hasn't been able to sleep through the night yet  Pt can make it FCI through before his back starts to hurt too much and he has to go back to the recliner  Pt states minor pain in his back/shoulder prior to the start of his treatment session  Objective: See treatment diary below      Assessment: Tolerated treatment well  Patient demonstrated fatigue post treatment, exhibited good technique with therapeutic exercises and would benefit from continued PT      Plan: Continue per plan of care  Progress treatment as tolerated  Eval/ Re-eval Auth #/ Referral # Total visits Start date  Expiration date Total active units  Total manual units  PT only or  PT+OT?      IE                                    Precautions:  Significant LBP and history, 2 stents placed in , Hypertension,  Left TSA 22,      TSA protocol    Specialty Daily Treatment Diary       Manuals 10/20 10/18 10/11 10/6 10/4   Visit # 15 14 13 12 11   STM UT, [facility] group        PROM shld Flex & abd All directions All directions  All directions   ST joint mobs                Warm-up        MH        Neuro Re-Ed        Posture correct        rows Rows & extensions YTB 2x10 ea Rows & extensions YTB 2x10 ea  YTB 2x10    Scap squeeze     20x   Isomets 4 way YTB 10x B ER/IR walkout 10x B  20x 4 ways    Pulleys 2 mins flex & abd 2 mins flex & abd 2 mins flex + abd 2 mins flex 2 mins flex   Prone rows & ext 15x B       Ther Ex        Gripping        Supine cane flex    2x10 AAROM    Pendulums 30x AP/ML 30x AP/ML   Wand flex        Wand ER  Supine 3x10 Supine 3x10  Supine 30x5s   Wand abd  Standing 2x10 Standing 2x10 Standing 3x10, 5s Standing 3x10, 5s   Elbow sup/pron        Elbow flex / ext        Supine AA cane press-up    3x10 2x10   Wall slides Wall walk-up 2x10  Flex in doorway w/RUE assistance 2x10, 5s Flex 2x10    Standing AROM flex & abd 2x10 ea       Standing B ER 2x10 w/passive stretch w/cane at end range 2x10 2x10     Table slides  Ball on table flex 2x10 Ball on table flex + abd 2x10 ea Flex 2x10 table  Ball on table 15x flex, 15x abd Flex 3x10   Ther Activity                                Modalities        CP                     Access Code: [email or web address]  URL: [email or web address]  Date: 09/22/2022  Prepared by:  Tony Salguero    Exercises  · Supine Shoulder Flexion Extension AAROM with Dowel - 3 x daily - 2 sets - 10 reps  · Supine Shoulder External Rotation in 45 Degrees Abduction AAROM with Dowel - 3 x daily - 2 sets - 10 reps  · Standing Shoulder Abduction AAROM with Dowel - 3 x daily - 2 sets - 10 reps  · Seated Elbow Flexion and Extension AROM - 3 x daily - 2 sets - 10 reps  · Isometric Shoulder Flexion at Wall - 3 x daily - 1 sets - 10 reps - 5 hold  · Isometric Shoulder Extension at Wall - 3 x daily - 1 sets - 10 reps - 5 hold  · Isometric Shoulder External Rotation at Wall - 3 x daily - 1 sets - 10 reps - 5 hold  · Standing Isometric Shoulder Internal Rotation at Doorway - 3 x daily - 1 sets - 10 reps - 5 hold

## 2022-10-25 ENCOUNTER — OFFICE VISIT (OUTPATIENT)
Dept: PHYSICAL THERAPY | Facility: CLINIC | Age: 77
End: 2022-10-25
Payer: COMMERCIAL

## 2022-10-25 DIAGNOSIS — G89.29 CHRONIC LEFT SHOULDER PAIN: ICD-10-CM

## 2022-10-25 DIAGNOSIS — M19.012 LOCALIZED OSTEOARTHRITIS OF LEFT SHOULDER: Primary | ICD-10-CM

## 2022-10-25 DIAGNOSIS — M25.512 CHRONIC LEFT SHOULDER PAIN: ICD-10-CM

## 2022-10-25 PROCEDURE — 97110 THERAPEUTIC EXERCISES: CPT

## 2022-10-25 PROCEDURE — 97112 NEUROMUSCULAR REEDUCATION: CPT

## 2022-10-25 NOTE — PROGRESS NOTES
Daily Note     Today's date: 10/25/2022  Patient name: Fredy Ahn  : 1945  MRN: [id number]  Referring provider: Harini Levin*  Dx:   Encounter Diagnosis     ICD-10-CM    1  Localized osteoarthritis of left shoulder  M19 012    2  Chronic left shoulder pain  M25 512     G89 29        Start Time: 853  Stop Time: 935  Total time in clinic (min): 42 minutes    Subjective: Pt reports that he is getting more movement in his shoulder and feels he can do more around the house  However, pt is getting consistent L sided neck pain throughout the day that travels down into the shoulder (upper trap distribution)  Objective: See treatment diary below      Assessment: Tolerated treatment well  Patient demonstrated fatigue post treatment, exhibited good technique with therapeutic exercises and would benefit from continued PT  Pt's complaints appear to be related to increased upper trap tension on the L side, given 2 stretches which helped relieve his pain/discomfort  Pt continues to work on PROM stretching and progressive AROM  Requires occasional VC and TC to promote proper scapulohumeral rhythm  Plan: Continue per plan of care  Progress treatment as tolerated  Eval/ Re-eval Auth #/ Referral # Total visits Start date  Expiration date Total active units  Total manual units  PT only or  PT+OT?      IE                                    Precautions:  Significant LBP and history, 2 stents placed in , Hypertension,  Left TSA 22,      TSA protocol    Specialty Daily Treatment Diary       Manuals 10/25 10/20 10/18 10/11 10/6   Visit # 16 15 14 13 12   STM UT, pec group        PROM shld  Flex & abd All directions All directions    ST joint mobs                Warm-up        MH        Neuro Re-Ed        Posture correct        rows Rows & extensions YTB 2x10 ea Rows & extensions YTB 2x10 ea Rows & extensions YTB 2x10 ea  YTB 2x10   Scap squeeze        Isomets 4 way YTB 10x B YTB 10x B ER/IR walkout 10x B  20x 4 ways   Pulleys 2 mins flex & abd 2 mins flex & abd 2 mins flex & abd 2 mins flex + abd 2 mins flex   Prone rows & ext  15x B      Ther Ex        Gripping        Upper trap str 3x30s B       C/s rot str 15x5s B       Supine cane flex     2x10 AAROM   Pendulums     30x AP/ML   Wand flex        Wand ER   Supine 3x10 Supine 3x10    Wand abd   Standing 2x10 Standing 2x10 Standing 3x10, 5s   Elbow sup/pron        Elbow flex / ext        Supine AA cane press-up     3x10   Wall slides  Wall walk-up 2x10  Flex in doorway w/RUE assistance 2x10, 5s Flex 2x10   Standing AROM flex & abd 2x10 ea 2x10 ea      Standing B ER 2x10 w/passive str w/cane at end range 2x10 w/passive stretch w/cane at end range 2x10 2x10    Table slides   Ball on table flex 2x10 Ball on table flex + abd 2x10 ea Flex 2x10 table  Ball on table 15x flex, 15x abd   Ther Activity                                Modalities        CP                     Access Code: [email or web address]  URL: [email or web address]  Date: 09/22/2022  Prepared by:  Kourtney [place]    Exercises  · Supine Shoulder Flexion Extension AAROM with Dowel - 3 x daily - 2 sets - 10 reps  · Supine Shoulder External Rotation in 45 Degrees Abduction AAROM with Dowel - 3 x daily - 2 sets - 10 reps  · Standing Shoulder Abduction AAROM with Dowel - 3 x daily - 2 sets - 10 reps  · Seated Elbow Flexion and Extension AROM - 3 x daily - 2 sets - 10 reps  · Isometric Shoulder Flexion at Wall - 3 x daily - 1 sets - 10 reps - 5 hold  · Isometric Shoulder Extension at Wall - 3 x daily - 1 sets - 10 reps - 5 hold  · Isometric Shoulder External Rotation at Wall - 3 x daily - 1 sets - 10 reps - 5 hold  · Standing Isometric Shoulder Internal Rotation at Doorway - 3 x daily - 1 sets - 10 reps - 5 hold

## 2022-10-27 ENCOUNTER — EVALUATION (OUTPATIENT)
Dept: PHYSICAL THERAPY | Facility: CLINIC | Age: 77
End: 2022-10-27
Payer: COMMERCIAL

## 2022-10-27 DIAGNOSIS — G89.29 CHRONIC LEFT SHOULDER PAIN: ICD-10-CM

## 2022-10-27 DIAGNOSIS — M25.512 CHRONIC LEFT SHOULDER PAIN: ICD-10-CM

## 2022-10-27 DIAGNOSIS — M19.012 LOCALIZED OSTEOARTHRITIS OF LEFT SHOULDER: Primary | ICD-10-CM

## 2022-10-27 PROCEDURE — 97110 THERAPEUTIC EXERCISES: CPT

## 2022-10-27 NOTE — PROGRESS NOTES
PT Re-Evaluation     Today's date: 10/27/2022  Patient name: Maikol Hendrix  : 1945  MRN: [id number]  Referring provider: Maggie Guerrero*  Dx:   Encounter Diagnosis     ICD-10-CM    1  Localized osteoarthritis of left shoulder  M19 012    2  Chronic left shoulder pain  M25 512     G89 29        Start Time: 0850  Stop Time: 0930  Total time in clinic (min): 40 minutes    Assessment  Assessment details: Maikol Hendrix is a 76 y o  male who presents to skilled OPPT for his 17th visit s/p L total shoulder arthroplasty with biceps tenodesis  Pt presents with decreased pain, improved LUE PROM, increased muscle activation of the LUE, impaired function, improving activity tolerance and function  Pt has begun working on more AROM of the LUE, requires moderate VC and TC to avoid shoulder hiking and promote scapular mobility  Pt continues to guard throughout PROM exercises and manual stretching which limits his gains, however is still making progress  Pt was re-educated on his POC and HEP  Pt continues with GH restriction, pain, and limited strength  Pt's pain and deficits are preventing him from performing self care, ADL's, and recreational activities without help or compensations  Pt would continue to benefit from skilled physical therapy services to decrease his pain, address his deficits, progress towards his goals, progress through his post-surgical protocol, and return to his PLOF  Impairments: abnormal muscle firing, abnormal muscle tone, abnormal or restricted ROM, abnormal movement, activity intolerance, impaired physical strength, pain with function, scapular dyskinesis, poor posture  and poor body mechanics  Understanding of Dx/Px/POC: good   Prognosis: good    Goals  Short term goals:  (6 weeks)  1  Patient will have pain level 2/10 left shoulder at rest- Met  2  Patient will report a 50% improvement in symptoms with ADL's- Met  3   Patient will demonstrate appropriate scapulohumeral rhythm with reaching shoulder height and below- Progressing  4  Patient will have improved left shoulder ROM by 20 degrees- Met    Long term goals: (12 weeks)  1  Patient will report 85 % improvement improvement in symptoms with ADL's- Progressing  2  Patient will have pain level 2/10 left shoulder with ADL's - Progressing  3  Patient will demonstrate appropriate scapulohumeral rhythm with reaching overhead- Progressing  4  Patient will be independent in a comprehensive home exercise program- Progressing      Plan  Patient would benefit from: PT eval and skilled physical therapy  Planned modality interventions: cryotherapy and thermotherapy: hydrocollator packs  Planned therapy interventions: joint mobilization, manual therapy, massage, neuromuscular re-education, patient education, postural training, strengthening, stretching, therapeutic activities, ADL training, functional ROM exercises, home exercise program, abdominal trunk stabilization and therapeutic exercise  Frequency: 2x week  Duration in weeks: 8  Treatment plan discussed with: patient        Subjective Evaluation    History of Present Illness  Date of surgery: 2022  Mechanism of injury: surgery  Mechanism of injury: 10/27/22 Update:  Pt feels that his shoulder is definitely improving  Pt is now able to wash his hair with his LUE, during the day he finds he can do more things  Certain movements will still give him pain at times  Pt notices more movement and slowly improving strength  Pt is still having difficulty at night, back pain that also leads to recliner  Pt feels he is 65% improved since the surgery     Pain  Current pain ratin  At best pain ratin  At worst pain ratin  Location: Anterior L shoulder, L elbow  Quality: dull ache and tight  Relieving factors: ice, rest and medications  Aggravating factors: lifting and overhead activity  Progression: improved    Social Support  Lives with: spouse    Employment status: not working (retired)  Hand dominance: right  Exercise history: Walking    Patient Goals  Patient goals for therapy: decreased pain and independence with ADLs/IADLs  Patient's goals regarding treatment: Driving  Objective     Postural Observations  Seated posture: fair        Palpation   Left   Hypertonic in the pectoralis minor and upper trapezius  Tenderness of the pectoralis minor and upper trapezius  Active Range of Motion   Left Shoulder   Flexion: 82 degrees   Abduction: 70 degrees   External rotation BTH: C2   Internal rotation BTB: sacrum     Right Shoulder   Flexion: 122 degrees   Abduction: 118 degrees   External rotation BTH: C7   Internal rotation BTB: T9     Left Elbow   Flexion: 135 degrees   Extension: 16 degrees   Forearm supination: 80 degrees   Forearm pronation: 90 degrees     Passive Range of Motion   Left Shoulder   Flexion: 109 degrees   Abduction: 85 degrees   External rotation 45°: 45 degrees   Internal rotation 45°: 62 degrees     Right Shoulder   Flexion: 145 degrees   Abduction: 140 degrees   External rotation 0°: 72 degrees   Internal rotation 0°: 85 degrees     Scapular Mobility   Left Shoulder   Scapular mobility: fair    Strength/Myotome Testing     Left Shoulder     Planes of Motion   Flexion: 3-   Abduction: 3-   External rotation at 0°: 3-   Internal rotation at 0°: 3-     Right Shoulder     Planes of Motion   Flexion: 4+   Abduction: 4+   External rotation at 0°: 4+   Internal rotation at 0°: 4+     Left Elbow   Flexion: 4+  Extension: 4+  Forearm supination: 4-  Forearm pronation: 4    Right Elbow   Flexion: 5  Extension: 5  Forearm supination: 5  Forearm pronation: 5                Eval/ Re-eval Auth #/ Referral # Total visits Start date  Expiration date Total active units  Total manual units  PT only or  PT+OT?    8/30  IE                                    Precautions:  Significant LBP and history, 2 stents placed in 2020, Hypertension,  Left TSA 8/18/22,      TSA protocol    Specialty Daily Treatment Diary       Manuals 10/27 10/25 10/20 10/18 10/11   Visit # 17 16 15 14 13   STM UT, pec group        PROM shld   Flex & abd All directions All directions   ST joint mobs                Warm-up        MH        Neuro Re-Ed        Posture correct        rows  Rows & extensions YTB 2x10 ea Rows & extensions YTB 2x10 ea Rows & extensions YTB 2x10 ea    Scap squeeze        Isomets 4 way  YTB 10x B YTB 10x B ER/IR walkout 10x B    Pulleys  2 mins flex & abd 2 mins flex & abd 2 mins flex & abd 2 mins flex + abd   Prone rows & ext   15x B     Ther Ex        Gripping        Upper trap str  3x30s B      C/s rot str  15x5s B      Supine cane flex        Pendulums        Wand flex        Wand ER    Supine 3x10 Supine 3x10   Wand abd    Standing 2x10 Standing 2x10   Elbow sup/pron        Elbow flex / ext        Supine AA cane press-up        Wall slides   Wall walk-up 2x10  Flex in doorway w/RUE assistance 2x10, 5s   Standing AROM flex & abd  2x10 ea 2x10 ea     Standing B ER  2x10 w/passive str w/cane at end range 2x10 w/passive stretch w/cane at end range 2x10 2x10   Table slides    Ball on table flex 2x10 Ball on table flex + abd 2x10 ea   Ther Activity                                Modalities        CP                     Access Code: [email or web address]  URL: [email or web address]  Date: 09/22/2022  Prepared by:  Lennox Officer    Exercises  · Supine Shoulder Flexion Extension AAROM with Dowel - 3 x daily - 2 sets - 10 reps  · Supine Shoulder External Rotation in 45 Degrees Abduction AAROM with Dowel - 3 x daily - 2 sets - 10 reps  · Standing Shoulder Abduction AAROM with Dowel - 3 x daily - 2 sets - 10 reps  · Seated Elbow Flexion and Extension AROM - 3 x daily - 2 sets - 10 reps  · Isometric Shoulder Flexion at Wall - 3 x daily - 1 sets - 10 reps - 5 hold  · Isometric Shoulder Extension at Wall - 3 x daily - 1 sets - 10 reps - 5 hold  · Isometric Shoulder External Rotation at [place] - 3 x daily - 1 sets - 10 reps - 5 hold  · Standing Isometric Shoulder Internal Rotation at Doorway - 3 x daily - 1 sets - 10 reps - 5 hold

## 2022-10-31 ENCOUNTER — OFFICE VISIT (OUTPATIENT)
Dept: PHYSICAL THERAPY | Facility: CLINIC | Age: 77
End: 2022-10-31

## 2022-10-31 DIAGNOSIS — M25.512 CHRONIC LEFT SHOULDER PAIN: ICD-10-CM

## 2022-10-31 DIAGNOSIS — M19.012 LOCALIZED OSTEOARTHRITIS OF LEFT SHOULDER: Primary | ICD-10-CM

## 2022-10-31 DIAGNOSIS — G89.29 CHRONIC LEFT SHOULDER PAIN: ICD-10-CM

## 2022-10-31 NOTE — PROGRESS NOTES
Daily Note     Today's date: 10/31/2022  Patient name: Frankie Martinez  : 1945  MRN: [id number]  Referring provider: Lyly Rodriguez*  Dx:   Encounter Diagnosis     ICD-10-CM    1  Localized osteoarthritis of left shoulder  M19 012    2  Chronic left shoulder pain  M25 512     G89 29        Start Time: 0930  Stop Time: 1015  Total time in clinic (min): 45 minutes    Subjective: Pt reports "not a great weekend" He reports it was not pertaining to his shoulder but felt "achy" all over  Reports he tested negative for covid  Objective: See treatment diary below      Assessment: Tolerated treatment well  Patient demonstrated fatigue post treatment, exhibited good technique with therapeutic exercises and would benefit from continued PT  Pt tolerated exercises today within protocol  No pain post  Edu pt on purchasing pulleys for HEP      Plan: Continue per plan of care  Eval/ Re-eval Auth #/ Referral # Total visits Start date  Expiration date Total active units  Total manual units  PT only or  PT+OT?      IE                                    Precautions:  Significant LBP and history, 2 stents placed in , Hypertension,  Left TSA 22,      TSA protocol    Specialty Daily Treatment Diary       Manuals 10/31 10/27 10/25 10/20 10/18   Visit #  17 16 15 14   STM UT, pec group        PROM shld Flex/abd   Flex & abd All directions   ST joint mobs                Warm-up        MH        Neuro Re-Ed        Posture correct        rows Rows & extensions YTB 2x10 ea  Rows & extensions YTB 2x10 ea Rows & extensions YTB 2x10 ea Rows & extensions YTB 2x10 ea   Scap squeeze        Isomets 4 way Ball on wall 10x ea  YTB 10x B YTB 10x B ER/IR walkout 10x B   Pulleys 2 mins flex & abd  2 mins flex & abd 2 mins flex & abd 2 mins flex & abd   Prone rows & ext    15x B    Ther Ex        Gripping        Upper trap str 3x30s B  3x30s B     C/s rot str 15x5s B  15x5s B     Supine cane flex        Pendulums Wand flex Supine 20x 1#       Wand ER     Supine 3x10   Wand abd Standing 2x10    Standing 2x10   Elbow sup/pron        Elbow flex / ext        Supine AA cane press-up        Wall slides    Wall walk-up 2x10    Standing AROM flex & abd 2x10 ea standing  10x flex supine  2x10 ea 2x10 ea    Standing B ER 2x10 w/passive str w/cane at end range  2x10 w/passive str w/cane at end range 2x10 w/passive stretch w/cane at end range 2x10   Table slides     Ball on table flex 2x10   Ther Activity                                Modalities        CP                     Access Code: [email or web address]  URL: [email or web address]  Date: 09/22/2022  Prepared by:  Risa Lombard    Exercises  · Supine Shoulder Flexion Extension AAROM with Dowel - 3 x daily - 2 sets - 10 reps  · Supine Shoulder External Rotation in 45 Degrees Abduction AAROM with Dowel - 3 x daily - 2 sets - 10 reps  · Standing Shoulder Abduction AAROM with Dowel - 3 x daily - 2 sets - 10 reps  · Seated Elbow Flexion and Extension AROM - 3 x daily - 2 sets - 10 reps  · Isometric Shoulder Flexion at Wall - 3 x daily - 1 sets - 10 reps - 5 hold  · Isometric Shoulder Extension at Wall - 3 x daily - 1 sets - 10 reps - 5 hold  · Isometric Shoulder External Rotation at Wall - 3 x daily - 1 sets - 10 reps - 5 hold  · Standing Isometric Shoulder Internal Rotation at Doorway - 3 x daily - 1 sets - 10 reps - 5 hold

## 2022-11-02 ENCOUNTER — OFFICE VISIT (OUTPATIENT)
Dept: PHYSICAL THERAPY | Facility: CLINIC | Age: 77
End: 2022-11-02

## 2022-11-02 DIAGNOSIS — M19.012 LOCALIZED OSTEOARTHRITIS OF LEFT SHOULDER: Primary | ICD-10-CM

## 2022-11-02 DIAGNOSIS — G89.29 CHRONIC LEFT SHOULDER PAIN: ICD-10-CM

## 2022-11-02 DIAGNOSIS — M25.512 CHRONIC LEFT SHOULDER PAIN: ICD-10-CM

## 2022-11-02 NOTE — PROGRESS NOTES
Daily Note     Today's date: 2022  Patient name: Luisa Barroso  : 1945  MRN: [id number]  Referring provider: Perez Townsend*  Dx:   Encounter Diagnosis     ICD-10-CM    1  Localized osteoarthritis of left shoulder  M19 012    2  Chronic left shoulder pain  M25 512     G89 29        Start Time: 1530  Stop Time: 1615  Total time in clinic (min): 45 minutes    Subjective: Pt states that his shoulder was very sore after his last treatment session  Pt continues to feel progress with his shoulder, however he has been feeling achy all over the past several days  Pt continues his HEP and just ordered a pulley system to exercise with at home  Objective: See treatment diary below      Assessment: Tolerated treatment well  Patient demonstrated fatigue post treatment, exhibited good technique with therapeutic exercises and would benefit from continued PT      Plan: Continue per plan of care  Progress treatment as tolerated  Eval/ Re-eval Auth #/ Referral # Total visits Start date  Expiration date Total active units  Total manual units  PT only or  PT+OT?      IE                                    Precautions:  Significant LBP and history, 2 stents placed in , Hypertension,  Left TSA 22,      TSA protocol    Specialty Daily Treatment Diary       Manuals 11/2 10/31 10/27 10/25 10/20   Visit # 23 18 17 16 15   STM UT, pec group        PROM shld  Flex/abd   Flex & abd   ST joint mobs                Warm-up                Neuro Re-Ed        Posture correct        Supine flex into ABC 1x through       rows Rows & ext blue 2x10 ea Rows & extensions YTB 2x10 ea  Rows & extensions YTB 2x10 ea Rows & extensions YTB 2x10 ea   Scap squeeze        Isomets 4 way YTB 10x B Ball on wall 10x ea  YTB 10x B YTB 10x B   Pulleys 2 mins flex & abd 2 mins flex & abd  2 mins flex & abd 2 mins flex & abd   Supine D2 2x10       Prone rows & ext     15x B   Ther Ex        Gripping        Corner pec str 3x30s       Upper trap str  3x30s B  3x30s B    C/s rot str  15x5s B  15x5s B    Supine cane flex        Pendulums        Wand flex  Supine 20x 1#      Wand ER 30x        Wand abd  Standing 2x10      Elbow sup/pron        Elbow flex / ext        Supine AA cane press-up        Wall slides     Wall walk-up 2x10   Standing AROM flex & abd 2x10 standing ea 2x10 ea standing  10x flex supine  2x10 ea 2x10 ea   Flexion into horiz abd 2x10 up to 90 deg       Standing B ER  2x10 w/passive str w/cane at end range  2x10 w/passive str w/cane at end range 2x10 w/passive stretch w/cane at end range   Table slides        Ther Activity                                Modalities        CP                     Access Code: [email or web address]  URL: [email or web address]  Date: 09/22/2022  Prepared by:  Guanako Joshi    Exercises  · Supine Shoulder Flexion Extension AAROM with Dowel - 3 x daily - 2 sets - 10 reps  · Supine Shoulder External Rotation in 45 Degrees Abduction AAROM with Dowel - 3 x daily - 2 sets - 10 reps  · Standing Shoulder Abduction AAROM with Dowel - 3 x daily - 2 sets - 10 reps  · Seated Elbow Flexion and Extension AROM - 3 x daily - 2 sets - 10 reps  · Isometric Shoulder Flexion at Wall - 3 x daily - 1 sets - 10 reps - 5 hold  · Isometric Shoulder Extension at Wall - 3 x daily - 1 sets - 10 reps - 5 hold  · Isometric Shoulder External Rotation at Wall - 3 x daily - 1 sets - 10 reps - 5 hold  · Standing Isometric Shoulder Internal Rotation at Doorway - 3 x daily - 1 sets - 10 reps - 5 hold

## 2022-11-08 ENCOUNTER — OFFICE VISIT (OUTPATIENT)
Dept: PHYSICAL THERAPY | Facility: CLINIC | Age: 77
End: 2022-11-08

## 2022-11-08 DIAGNOSIS — M25.512 CHRONIC LEFT SHOULDER PAIN: ICD-10-CM

## 2022-11-08 DIAGNOSIS — G89.29 CHRONIC LEFT SHOULDER PAIN: ICD-10-CM

## 2022-11-08 DIAGNOSIS — M19.012 LOCALIZED OSTEOARTHRITIS OF LEFT SHOULDER: Primary | ICD-10-CM

## 2022-11-08 NOTE — PROGRESS NOTES
Daily Note     Today's date: 2022  Patient name: Oniel Monteiro  : 1945  MRN: [id number]  Referring provider: Luz Elena Carlos*  Dx:   Encounter Diagnosis     ICD-10-CM    1  Localized osteoarthritis of left shoulder  M19 012    2  Chronic left shoulder pain  M25 512     G89 29        Start Time: 0850  Stop Time: 0930  Total time in clinic (min): 40 minutes    Subjective: Pt states he is having some increased and constant pain of b/l shoulders  Pt is also experiencing GI issues which he isn't sure if that is contributing to his general pain and discomfort  Pt states feeling sore after his last session but no increases in pain  Objective: See treatment diary below      Assessment: Tolerated treatment well  Patient demonstrated fatigue post treatment, exhibited good technique with therapeutic exercises and would benefit from continued PT      Plan: Continue per plan of care  Progress treatment as tolerated  Eval/ Re-eval Auth #/ Referral # Total visits Start date  Expiration date Total active units  Total manual units  PT only or  PT+OT?   IE                                    Precautions:  Significant LBP and history, 2 stents placed in , Hypertension,  Left TSA 22,      TSA protocol    Specialty Daily Treatment Diary       Manuals 11/8 11/2 10/31 10/27 10/25   Visit # 20 19 18 17 16   STM UT, pec group        PROM shld All directions  Flex/abd     ST joint mobs        GH mobilizaitons Post & inf gr   I-II       Warm-up        MH        Neuro Re-Ed        Posture correct        Supine flex into ABC 1x through 1x through      rows  Rows & ext blue 2x10 ea Rows & extensions YTB 2x10 ea  Rows & extensions YTB 2x10 ea   Scap squeeze        Isomets 4 way  YTB 10x B Ball on wall 10x ea  YTB 10x B   Pulleys 2 mins flex & abd 2 mins flex & abd 2 mins flex & abd  2 mins flex & abd   Supine D2 3x10 2x10      Prone rows & ext        Ther Ex        Gripping        Corner pec str 3x30s      Upper trap str   3x30s B  3x30s B   C/s rot str   15x5s B  15x5s B   Supine cane flex        Pendulums        Wand flex   Supine 20x 1#     Wand ER  30x       Wand abd   Standing 2x10     Elbow sup/pron        Elbow flex / ext        Supine AA cane press-up        Wall slides        Standing AROM flex & abd  2x10 standing ea 2x10 ea standing  10x flex supine  2x10 ea   Flexion into horiz abd 2x10 up to 90 deg 2x10 up to 90 deg      Standing B ER   2x10 w/passive str w/cane at end range  2x10 w/passive str w/cane at end range   Table slides        Ther Activity                                Modalities        CP                     Access Code: [email or web address]  URL: [email or web address]  Date: 09/22/2022  Prepared by:  Opal Baca    Exercises  · Supine Shoulder Flexion Extension AAROM with Dowel - 3 x daily - 2 sets - 10 reps  · Supine Shoulder External Rotation in 45 Degrees Abduction AAROM with Dowel - 3 x daily - 2 sets - 10 reps  · Standing Shoulder Abduction AAROM with Dowel - 3 x daily - 2 sets - 10 reps  · Seated Elbow Flexion and Extension AROM - 3 x daily - 2 sets - 10 reps  · Isometric Shoulder Flexion at Wall - 3 x daily - 1 sets - 10 reps - 5 hold  · Isometric Shoulder Extension at Wall - 3 x daily - 1 sets - 10 reps - 5 hold  · Isometric Shoulder External Rotation at Wall - 3 x daily - 1 sets - 10 reps - 5 hold  · Standing Isometric Shoulder Internal Rotation at Doorway - 3 x daily - 1 sets - 10 reps - 5 hold

## 2022-11-10 ENCOUNTER — OFFICE VISIT (OUTPATIENT)
Dept: PHYSICAL THERAPY | Facility: CLINIC | Age: 77
End: 2022-11-10

## 2022-11-10 DIAGNOSIS — M19.012 LOCALIZED OSTEOARTHRITIS OF LEFT SHOULDER: Primary | ICD-10-CM

## 2022-11-10 DIAGNOSIS — G89.29 CHRONIC LEFT SHOULDER PAIN: ICD-10-CM

## 2022-11-10 DIAGNOSIS — M25.512 CHRONIC LEFT SHOULDER PAIN: ICD-10-CM

## 2022-11-10 NOTE — PROGRESS NOTES
Daily Note     Today's date: 11/10/2022  Patient name: Dionna Gordon  : 1945  MRN: [id number]  Referring provider: Yadira Mitchell  Dx:   Encounter Diagnosis     ICD-10-CM    1  Localized osteoarthritis of left shoulder  M19 012    2  Chronic left shoulder pain  M25 512     G89 29        Start Time: 0850  Stop Time: 0930  Total time in clinic (min): 40 minutes    Subjective: Pt states overall he is feeling a little better than his previous session, he has a doctor appointment tomorrow to see why he was feeling unwell  Pt states shoulder feels fine today, still tight and achy  No new complaints  Objective: See treatment diary below      Assessment: Tolerated treatment well  Patient demonstrated fatigue post treatment, exhibited good technique with therapeutic exercises and would benefit from continued PT  Continued to work on light shoulder and scapular mobilizations to help with shoulder mobility and ROM  Pt was given more functional ROM exercises today with good tolerance, emphasis on decreased shoulder compensations  Plan: Continue per plan of care  Progress treatment as tolerated  Eval/ Re-eval Auth #/ Referral # Total visits Start date  Expiration date Total active units  Total manual units  PT only or  PT+OT?   IE                                    Precautions:  Significant LBP and history, 2 stents placed in , Hypertension,  Left TSA 22,      TSA protocol    Specialty Daily Treatment Diary       Manuals 11/10 11/8 11/2 10/31 10/27   Visit # 21 20 19 18 17   STM UT, pec group        PROM shld  All directions  Flex/abd    ST joint mobs        scap mobilizations Flexion mob  [address] mobilizaitons Post & inf gr  II-III Post & inf gr   I-II      Warm-up                Neuro Re-Ed        Posture correct        Supine flex into ABC 1x through @120 1x through 1x through     rows   Rows & ext blue 2x10 ea Rows & extensions YTB 2x10 ea    Scap squeeze Isomets 4 way   YTB 10x B Ball on wall 10x ea    Pulleys 2 mins flex & abd 2 mins flex & abd 2 mins flex & abd 2 mins flex & abd    Supine D2 3x10 3x10 2x10     Prone rows & ext        Ther Ex        Gripping        Corner pec str   3x30s     Upper trap str    3x30s B    C/s rot str    15x5s B    Supine cane flex        Pendulums        Wand flex    Supine 20x 1#    Wand ER   30x      Wand abd    Standing 2x10    Elbow sup/pron        Elbow flex / ext        Supine AA cane press-up        Wall slides        Standing AROM flex & abd   2x10 standing ea 2x10 ea standing  10x flex supine    Functional ER into IR 2x10       Shoulder flexion cone/weight placement on shelf 5x w/cones 1st shelf  3x w/1&2lb weights 1st shelf       Flexion into horiz abd +horiz add 2x10 @90 deg 2x10 up to 90 deg 2x10 up to 90 deg     Standing B ER 2x10 L w/o cane AROM   2x10 w/passive str w/cane at end range    Table slides        Ther Activity                                Modalities        CP                     Access Code: [email or web address]  URL: [email or web address]  Date: 09/22/2022  Prepared by:  Brigitte Barnes    Exercises  · Supine Shoulder Flexion Extension AAROM with Dowel - 3 x daily - 2 sets - 10 reps  · Supine Shoulder External Rotation in 45 Degrees Abduction AAROM with Dowel - 3 x daily - 2 sets - 10 reps  · Standing Shoulder Abduction AAROM with Dowel - 3 x daily - 2 sets - 10 reps  · Seated Elbow Flexion and Extension AROM - 3 x daily - 2 sets - 10 reps  · Isometric Shoulder Flexion at Wall - 3 x daily - 1 sets - 10 reps - 5 hold  · Isometric Shoulder Extension at Wall - 3 x daily - 1 sets - 10 reps - 5 hold  · Isometric Shoulder External Rotation at Wall - 3 x daily - 1 sets - 10 reps - 5 hold  · Standing Isometric Shoulder Internal Rotation at Doorway - 3 x daily - 1 sets - 10 reps - 5 hold

## 2022-11-15 ENCOUNTER — OFFICE VISIT (OUTPATIENT)
Dept: PHYSICAL THERAPY | Facility: CLINIC | Age: 77
End: 2022-11-15

## 2022-11-15 DIAGNOSIS — M25.512 CHRONIC LEFT SHOULDER PAIN: ICD-10-CM

## 2022-11-15 DIAGNOSIS — M19.012 LOCALIZED OSTEOARTHRITIS OF LEFT SHOULDER: Primary | ICD-10-CM

## 2022-11-15 DIAGNOSIS — G89.29 CHRONIC LEFT SHOULDER PAIN: ICD-10-CM

## 2022-11-15 NOTE — PROGRESS NOTES
Daily Note     Today's date: 11/15/2022  Patient name: Osiel Young  : 1945  MRN: [id number]  Referring provider: Jarett Kyle*  Dx:   Encounter Diagnosis     ICD-10-CM    1  Localized osteoarthritis of left shoulder  M19 012    2  Chronic left shoulder pain  M25 512     G89 29        Start Time: 1100  Stop Time: 1145  Total time in clinic (min): 45 minutes    Subjective: Pt reports that he went to the doctor and was told he has inflammation throughout his body right now, which is why he wasn't feeling well for the past week  Pt states taking different medications prescribed to him which is making him feel slightly better  Pt feels he had a setback because of last week's issue but is improving again  Objective: See treatment diary below      Assessment: Tolerated treatment well  Patient demonstrated fatigue post treatment, exhibited good technique with therapeutic exercises and would benefit from continued PT  Pt progressed with surgical protocol for gentle IR stretching and progressions with GH stability exercises  Pt tolerated additions well with no significant increases in pain/discomfort  Pt continues to be restricted with his ROM and strength in a gravity affected position  Plan: Continue per plan of care  Progress treatment as tolerated  Eval/ Re-eval Auth #/ Referral # Total visits Start date  Expiration date Total active units  Total manual units  PT only or  PT+OT?   IE                                    Precautions:  Significant LBP and history, 2 stents placed in , Hypertension,  Left TSA 22,      TSA protocol    Specialty Daily Treatment Diary       Manuals 11/15 11/10 11/8 11/2 10/31   Visit # 22 21 20 19 18   STM UT, pec group        PROM shld All directions  All directions  Flex/abd   ST joint mobs        scap mobilizations  Flexion mob  [address] mobilizaitons Post & inf gr  II-III Post & inf gr  II-III Post & inf gr   I-II     Warm-up         Neuro Re-Ed        Posture correct        Supine flex into ABC 1x through @120 1x through @120 1x through 1x through    rows    Rows & ext blue 2x10 ea Rows & extensions YTB 2x10 ea   Scap squeeze        Isomets 4 way YTB 20x ER/IR   YTB 10x B Ball on wall 10x ea   Wall ball circles @90 deg flex 30x cw/ccw       Pulleys 2 mins flex & abd 2 mins flex & abd 2 mins flex & abd 2 mins flex & abd 2 mins flex & abd   Supine D2  3x10 3x10 2x10    Prone rows & ext        Ther Ex        Gripping        Corner pec str    3x30s    Upper trap str     3x30s B   C/s rot str     15x5s B   Supine cane flex        Pendulums        Wand flex     Supine 20x 1#   Wand ER    30x     Wand abd     Standing 2x10   Elbow sup/pron        Elbow flex / ext        Supine AA cane press-up        Wall slides        Standing AROM flex & abd    2x10 standing ea 2x10 ea standing  10x flex supine   quin biceps curls 3 0 10x 3 ways       quin triceps ext 3 5 3x10       IR standing SOS str 20x5s       Functional ER into IR 2x10 2x10      Shoulder flexion cone/weight placement on shelf  5x w/cones 1st shelf  3x w/1&2lb weights 1st shelf      Flexion into horiz abd  +horiz add 2x10 @90 deg 2x10 up to 90 deg 2x10 up to 90 deg    Standing B ER  2x10 L w/o cane AROM   2x10 w/passive str w/cane at end range   Table slides        Ther Activity                                Modalities        CP                     Access Code: [email or web address]  URL: [email or web address]  Date: 09/22/2022  Prepared by:  Margaret Alarcon    Exercises  · Supine Shoulder Flexion Extension AAROM with Dowel - 3 x daily - 2 sets - 10 reps  · Supine Shoulder External Rotation in 45 Degrees Abduction AAROM with Dowel - 3 x daily - 2 sets - 10 reps  · Standing Shoulder Abduction AAROM with Dowel - 3 x daily - 2 sets - 10 reps  · Seated Elbow Flexion and Extension AROM - 3 x daily - 2 sets - 10 reps  · Isometric Shoulder Flexion at Wall - 3 x daily - 1 sets - 10 reps - 5 hold  · Isometric Shoulder Extension at Wall - 3 x daily - 1 sets - 10 reps - 5 hold  · Isometric Shoulder External Rotation at Wall - 3 x daily - 1 sets - 10 reps - 5 hold  · Standing Isometric Shoulder Internal Rotation at Doorway - 3 x daily - 1 sets - 10 reps - 5 hold

## 2022-11-17 ENCOUNTER — OFFICE VISIT (OUTPATIENT)
Dept: PHYSICAL THERAPY | Facility: CLINIC | Age: 77
End: 2022-11-17

## 2022-11-17 DIAGNOSIS — M19.012 LOCALIZED OSTEOARTHRITIS OF LEFT SHOULDER: Primary | ICD-10-CM

## 2022-11-17 DIAGNOSIS — M25.512 CHRONIC LEFT SHOULDER PAIN: ICD-10-CM

## 2022-11-17 DIAGNOSIS — G89.29 CHRONIC LEFT SHOULDER PAIN: ICD-10-CM

## 2022-11-17 NOTE — PROGRESS NOTES
Daily Note     Today's date: 2022  Patient name: Shwetha Rosales  : 1945  MRN: [id number]  Referring provider: Efrem Garcia*  Dx:   Encounter Diagnosis     ICD-10-CM    1  Localized osteoarthritis of left shoulder  M19 012       2  Chronic left shoulder pain  M25 512     G89 29           Start Time: 0845  Stop Time: 0940  Total time in clinic (min): 55 minutes    Subjective: Pt states the side effects of his medicine is causing joint pain and body aches so his shoulder is not feeling the best today but he is feeling "ok" overall  Pt states continuing his stretches as best he can  Pt states tightness and discomfort prior to the start of his treatment session  Objective: See treatment diary below      Assessment: Tolerated treatment well  Patient demonstrated fatigue post treatment, exhibited good technique with therapeutic exercises and would benefit from continued PT  Pt continues with ROM limitations in all planes of motion  Pt requires frequent VC for scapular recruitment during AROM, too much biceps activation  Pt responded well to [facility] mobilizations with decreased pain afterward  Pt would continue to benefit from skilled PT to increase [facility] ROM, scapular recruitment, and decrease compensations  Plan: Continue per plan of care  Progress treatment as tolerated  Eval/ Re-eval Auth #/ Referral # Total visits Start date  Expiration date Total active units  Total manual units  PT only or  PT+OT?   IE                                    Precautions:  Significant LBP and history, 2 stents placed in , Hypertension,  Left TSA 22,      TSA protocol    Specialty Daily Treatment Diary       Manuals 11/17 11/15 11/10 11/8 11/2   Visit # 23 22 21 20 19   [facility] [place], pec group        PROM shld All directions All directions  All directions    ST joint mobs        scap mobilizations   Flexion mob  [facility] mobilizaitons Post & inf gr  II-III Post & inf gr  II-III Post & inf gr  II-III Post & inf gr  I-II    Warm-up        MH        Neuro Re-Ed        Posture correct        Supine flex into ABC  1x through @120 1x through @120 1x through 1x through   rows     Rows & ext blue 2x10 ea   Scap squeeze        Serratus wobbles YTB 2x30s       Isomets 4 way YTB 20x ER/IR ea YTB 20x ER/IR   YTB 10x B   Wall ball circles @90, 110 deg flex 30x cw/ccw @90 deg flex 30x cw/ccw      Pulleys 2 mins flex & abd 2 mins flex & abd 2 mins flex & abd 2 mins flex & abd 2 mins flex & abd   Supine D2 Standing at wall 2x10  3x10 3x10 2x10   Prone rows & ext        Ther Ex        Gripping        Corner pec str     3x30s   Upper trap str        C/s rot str        Supine cane flex        Pendulums        Wand flex        Wand ER     30x    Wand abd        Elbow sup/pron        Elbow flex / ext        Supine AA cane press-up        Wall slides        Standing AROM flex & abd     2x10 standing ea   quin biceps curls 3x0 10x 3 ways 3 0 10x 3 ways      quin triceps ext 3 5 3x10 3 5 3x10      IR standing SOS str 20x5s 20x5s      Functional ER into IR  2x10 2x10     Shoulder flexion cone/weight placement on shelf   5x w/cones 1st shelf  3x w/1&2lb weights 1st shelf     Flexion into horiz abd   +horiz add 2x10 @90 deg 2x10 up to 90 deg 2x10 up to 90 deg   Standing abd into ER Up to 45 deg abd 2x10       Standing B ER   2x10 L w/o cane AROM     Table slides        Ther Activity                                Modalities        CP                     Access Code: [email or web address]  URL: [email or web address]  Date: 09/22/2022  Prepared by:  Sidonie Dubin    Exercises  · Supine Shoulder Flexion Extension AAROM with Dowel - 3 x daily - 2 sets - 10 reps  · Supine Shoulder External Rotation in 45 Degrees Abduction AAROM with Dowel - 3 x daily - 2 sets - 10 reps  · Standing Shoulder Abduction AAROM with Dowel - 3 x daily - 2 sets - 10 reps  · Seated Elbow Flexion and Extension AROM - 3 x daily - 2 sets - 10 reps  · Isometric Shoulder Flexion at Wall - 3 x daily - 1 sets - 10 reps - 5 hold  · Isometric Shoulder Extension at Wall - 3 x daily - 1 sets - 10 reps - 5 hold  · Isometric Shoulder External Rotation at Wall - 3 x daily - 1 sets - 10 reps - 5 hold  · Standing Isometric Shoulder Internal Rotation at Doorway - 3 x daily - 1 sets - 10 reps - 5 hold

## 2022-11-21 ENCOUNTER — OFFICE VISIT (OUTPATIENT)
Dept: OBGYN CLINIC | Facility: CLINIC | Age: 77
End: 2022-11-21

## 2022-11-21 ENCOUNTER — OFFICE VISIT (OUTPATIENT)
Dept: PHYSICAL THERAPY | Facility: CLINIC | Age: 77
End: 2022-11-21

## 2022-11-21 VITALS
WEIGHT: 169 LBS | BODY MASS INDEX: 26.53 KG/M2 | HEART RATE: 63 BPM | SYSTOLIC BLOOD PRESSURE: 137 MMHG | HEIGHT: 67 IN | DIASTOLIC BLOOD PRESSURE: 72 MMHG

## 2022-11-21 DIAGNOSIS — Z96.612 STATUS POST TOTAL REPLACEMENT OF LEFT SHOULDER: Primary | ICD-10-CM

## 2022-11-21 DIAGNOSIS — G89.29 CHRONIC LEFT SHOULDER PAIN: ICD-10-CM

## 2022-11-21 DIAGNOSIS — M19.012 LOCALIZED OSTEOARTHRITIS OF LEFT SHOULDER: Primary | ICD-10-CM

## 2022-11-21 DIAGNOSIS — M25.512 CHRONIC LEFT SHOULDER PAIN: ICD-10-CM

## 2022-11-21 RX ORDER — MESALAMINE 1000 MG/1
SUPPOSITORY RECTAL
COMMUNITY
Start: 2022-11-11

## 2022-11-21 RX ORDER — MESALAMINE 4 G/60ML
ENEMA RECTAL
COMMUNITY
Start: 2022-11-11

## 2022-11-21 NOTE — PROGRESS NOTES
Assessment/Plan:  1  Status post total replacement of left shoulder  Ambulatory Referral to Physical Therapy        Scribe Attestation    I,:  Owen Sen am acting as a scribe while in the presence of the attending physician :       I,:  Kita Vides MD personally performed the services described in this documentation    as scribed in my presence :         Adriana Vinson upon exam demonstrates improvements in strength and range of motion now 3 months status post left shoulder standard total shoulder arthroplasty  I am pleased with his progress today  I also reviewed his recent therapy notes which report gradual improvements and good progress towards his goals  His incision appears well healed with no signs of infection  Regarding the dull pain he is having, I do believe this will gradually resolve as he continues to improve his strength in his shoulder  He may continue his daily activities as tolerated  He may also continue formal therapy through the end of the year and was provided a new therapy script in the office today  I do also recommend utilizing heat particularly in the morning and ice at the end of the day to alleviate symptoms  I did also emphasize the long recovery that does come with his extensive surgery and encouraged him to continue his daily routine as he is showing good progression  Mook Aguilera verbalized understanding and was amenable to this plan  He will follow up with me again in 6 weeks for repeat clinical evaluation  Subjective:   Kami Maradiaga is a 76 y o  male who presents for follow-up evaluation of his left shoulder  He is approximately 3 months status post left shoulder standard total shoulder arthroplasty and biceps tenodesis  Overall he states he is making improvements  He does note his motion has improved drastically  He does feel a constant dull pain throughout his shoulder as well as symptoms from his neck to his fingers   He has been compliant with attending formal physical therapy and has noticed improvements there as well  He also reports occasional clicking in the shoulder that is not painful  Review of Systems   Constitutional: Negative for chills, fever and unexpected weight change  HENT: Negative for nosebleeds and sore throat  Eyes: Negative for pain, redness and visual disturbance  Respiratory: Negative for cough, shortness of breath and wheezing  Cardiovascular: Negative for chest pain, palpitations and leg swelling  Gastrointestinal: Negative for nausea and vomiting  Endocrine: Negative for polydipsia and polyuria  Genitourinary: Negative for dysuria and hematuria  Musculoskeletal: Positive for arthralgias and neck pain  Negative for joint swelling  As noted in HPI   Skin: Negative for rash and wound  Neurological: Negative for dizziness, numbness and headaches  Psychiatric/Behavioral: Negative for decreased concentration  The patient is not nervous/anxious            Past Medical History:   Diagnosis Date   • Coronary artery disease    • CPAP (continuous positive airway pressure) dependence    • Hypercholesterolemia    • Hypertension    • Sleep apnea        Past Surgical History:   Procedure Laterality Date   • BACK SURGERY     • CARDIAC LOOP RECORDER     • CORONARY STENT PLACEMENT      x 2   • ND RECONSTR TOTAL SHOULDER IMPLANT Left 8/18/2022    Procedure: LEFT SHOULDER STANDARD TOTAL SHOULDER ARTHROPLASTY AND BICEPS TENODESIS;  Surgeon: Justo Guerra MD;  Location: [facility];  Service: Orthopedics   • SPINAL FUSION      L2, L3 and L4   • SPINAL STIMULATOR PLACEMENT  09/2021       Family History   Problem Relation Age of Onset   • No Known Problems Mother        Social History     Occupational History   • Not on file   Tobacco Use   • Smoking status: Former     Types: Cigarettes   • Smokeless tobacco: Never   Substance and Sexual Activity   • Alcohol use: Not Currently   • Drug use: Yes     Types: Marijuana Comment: Medical Marijuana   • Sexual activity: Not on file         Current Outpatient Medications:   •  amLODIPine (NORVASC) 10 mg tablet, , Disp: , Rfl:   •  aspirin (ECOTRIN LOW STRENGTH) 81 mg EC tablet, Take 81 mg by mouth daily Last dose 8/11/22, Disp: , Rfl:   •  atorvastatin (LIPITOR) 40 mg tablet, daily at bedtime, Disp: , Rfl:   •  Azelastine HCl 137 MCG/SPRAY SOLN, , Disp: , Rfl:   •  latanoprost (XALATAN) 0 005 % ophthalmic solution, , Disp: , Rfl:   •  mesalamine (CANASA) 1,000 mg suppository, , Disp: , Rfl:   •  mesalamine (ROWASA) 4 g, , Disp: , Rfl:   •  metoprolol succinate (TOPROL-XL) 25 mg 24 hr tablet, daily at bedtime Takes  1/2 tab, Disp: , Rfl:   •  oxybutynin (DITROPAN-XL) 10 MG 24 hr tablet, 10 mg  in the morning and 10 mg before bedtime  , Disp: , Rfl:   •  pantoprazole (PROTONIX) 40 mg tablet, , Disp: , Rfl:   •  Prenatal Vit-Fe Fumarate-FA (M-Vit) tablet, Take 1 tablet by mouth daily, Disp: , Rfl:   •  oxyCODONE (Roxicodone) 5 immediate release tablet, Take 1 tablet (5 mg total) by mouth every 4 (four) hours as needed for moderate pain for up to 20 doses Max Daily Amount: 30 mg (Patient not taking: Reported on 11/21/2022), Disp: 20 tablet, Rfl: 0    No Known Allergies    Objective:  Vitals:    11/21/22 0845   BP: 137/72   Pulse: 63       Left Shoulder Exam     Tenderness   The patient is experiencing no tenderness  Range of Motion   Active abduction: 90   External rotation: 60   Internal rotation 0 degrees: Sacrum     Muscle Strength   Left shoulder normal muscle strength: 4+/5 ER  Abduction: 4/5   Internal rotation: 5/5   Supraspinatus: 4/5   Subscapularis: 5/5     Other   Scars: present (Well healed surgical incision )  Sensation: normal  Pulse: present             Physical Exam  HENT:      Head: Normocephalic and atraumatic  Eyes:      General:         Right eye: No discharge  Left eye: No discharge        Conjunctiva/sclera: Conjunctivae normal       Pupils: Pupils are equal, round, and reactive to light  Cardiovascular:      Rate and Rhythm: Normal rate  Pulmonary:      Effort: Pulmonary effort is normal  No respiratory distress  Musculoskeletal:         General: No swelling or tenderness  Cervical back: Normal range of motion and neck supple  Comments: As noted in HPI   Skin:     General: Skin is warm and dry  Neurological:      Mental Status: He is alert and oriented to person, place, and time  I have personally reviewed pertinent films in PACS and my interpretation is as follows: No imaging reviewed today  This document was created using speech voice recognition software  Grammatical errors, random word insertions, pronoun errors, and incomplete sentences are an occasional consequence of this system due to software limitations, ambient noise, and hardware issues  Any formal questions or concerns about content, text, or information contained within the body of this dictation should be directly addressed to the provider for clarification

## 2022-11-23 ENCOUNTER — EVALUATION (OUTPATIENT)
Dept: PHYSICAL THERAPY | Facility: CLINIC | Age: 77
End: 2022-11-23

## 2022-11-23 DIAGNOSIS — M25.512 CHRONIC LEFT SHOULDER PAIN: ICD-10-CM

## 2022-11-23 DIAGNOSIS — G89.29 CHRONIC LEFT SHOULDER PAIN: ICD-10-CM

## 2022-11-23 DIAGNOSIS — M19.012 LOCALIZED OSTEOARTHRITIS OF LEFT SHOULDER: Primary | ICD-10-CM

## 2022-11-23 NOTE — PROGRESS NOTES
PT Re-Evaluation     Today's date: 2022  Patient name: Abi Lott  : 1945  MRN: [id number]  Referring provider: Mitchell Henson*  Dx:   Encounter Diagnosis     ICD-10-CM    1  Localized osteoarthritis of left shoulder  M19 012       2  Chronic left shoulder pain  M25 512     G89 29           Start Time: 1100  Stop Time: 1145  Total time in clinic (min): 45 minutes    Assessment  Assessment details: Abi Lott is a 76 y o  male who presents to skilled OPPT for his 25th visit s/p L total shoulder arthroplasty with biceps tenodesis  Pt presents with decreased pain, improved LUE PROM, increased muscle activation of the LUE, impaired function, improving activity tolerance and function  Pt is slowly progressing with his overall motion and use of his shoulder, still requires moderate VC and TC to avoid shoulder hiking and promote scapular mobility  Pt also shows more compensations with his biceps help with shoulder flexion, needs cueing to utilize RTC more  Pt no longer guards throughout PROM exercises and manual stretching, is making progress with PROM and less pain during exercises  Pt's remaining pain and deficits are preventing him from performing self care, ADL's, and recreational activities without help or compensations  Pt would continue to benefit from skilled physical therapy services to decrease his pain, address his deficits, progress towards his goals, progress through his post-surgical protocol, and return to his PLOF  Impairments: abnormal muscle firing, abnormal muscle tone, abnormal or restricted ROM, abnormal movement, activity intolerance, impaired physical strength, pain with function, scapular dyskinesis, poor posture  and poor body mechanics  Understanding of Dx/Px/POC: good   Prognosis: good    Goals  Short term goals:  (6 weeks)  1  Patient will have pain level 2/10 left shoulder at rest- Met  2   Patient will report a 50% improvement in symptoms with ADL's- Met  3  Patient will demonstrate appropriate scapulohumeral rhythm with reaching shoulder height and below- Progressing  4  Patient will have improved left shoulder ROM by 20 degrees- Met    Long term goals: (12 weeks)  1  Patient will report 85 % improvement improvement in symptoms with ADL's- Progressing  2  Patient will have pain level 2/10 left shoulder with ADL's - Progressing  3  Patient will demonstrate appropriate scapulohumeral rhythm with reaching overhead- Progressing  4  Patient will be independent in a comprehensive home exercise program- Progressing      Plan  Patient would benefit from: PT eval and skilled physical therapy  Planned modality interventions: cryotherapy and thermotherapy: hydrocollator packs  Planned therapy interventions: joint mobilization, manual therapy, massage, neuromuscular re-education, patient education, postural training, strengthening, stretching, therapeutic activities, ADL training, functional ROM exercises, home exercise program, abdominal trunk stabilization and therapeutic exercise  Frequency: 2x week  Duration in weeks: 8  Treatment plan discussed with: patient        Subjective Evaluation    History of Present Illness  Date of surgery: 2022  Mechanism of injury: surgery  Mechanism of injury: 22 Update:  Pt states that his shoulder is definitely doing better, not where he wants to be yet  Pt feels he has more ROM and is doing more things around the house  Pt states constant soreness, however feels better after doing his exercises  Pt states Dr Denzil Primrose is pleased with his progress  Pt feels PT was very beneficial for his shoulder and would continue to get better with more PT  Pt continues to try to sleep in bed, but it isn't working out well, wakes up from pain in his shoulder, usually does better in the recliner     Pain  Current pain rating: 3  At best pain ratin  At worst pain ratin (unexpected movements that doesn't last long)  Location: base of neck on the L down to the hand  Quality: dull ache and tight  Relieving factors: rest  Aggravating factors: lifting and overhead activity  Progression: improved    Social Support  Lives with: spouse    Employment status: not working (retired)  Hand dominance: right  Exercise history: Walking    Patient Goals  Patient goals for therapy: decreased pain and independence with ADLs/IADLs  Patient goal: driving        Objective     Postural Observations  Seated posture: fair  Standing posture: fair        Palpation   Left   Hypertonic in the pectoralis minor and upper trapezius  Tenderness of the pectoralis minor and upper trapezius       Active Range of Motion   Cervical/Thoracic Spine       Cervical    Flexion: 60 degrees   Extension: 40 degrees     with pain  Left lateral flexion: 18 degrees      Right lateral flexion: 16 degrees      Left rotation: 40 degrees  Right rotation: 60 degrees         Left Shoulder   Flexion: 100 degrees   Abduction: 82 degrees   External rotation BTH: C3   Internal rotation BTB: sacrum     Right Shoulder   Flexion: 122 degrees   Abduction: 118 degrees   External rotation BTH: C7   Internal rotation BTB: T9     Left Elbow   Flexion: 140 degrees   Extension: 15 degrees   Forearm supination: 85 degrees   Forearm pronation: 92 degrees     Passive Range of Motion   Left Shoulder   Flexion: 110 degrees   Abduction: 92 degrees   External rotation 45°: 50 degrees   Internal rotation 45°: 70 degrees     Right Shoulder   Flexion: 145 degrees   Abduction: 140 degrees   External rotation 0°: 72 degrees   Internal rotation 0°: 85 degrees     Scapular Mobility   Left Shoulder   Scapular mobility: fair    Strength/Myotome Testing     Left Shoulder     Planes of Motion   Flexion: 4-   Abduction: 4-   External rotation at 0°: 3+   Internal rotation at 0°: 4     Right Shoulder     Planes of Motion   Flexion: 4+   Abduction: 4+   External rotation at 0°: 4+   Internal rotation at 0°: 4+     Left Elbow Flexion: 4+  Extension: 4+  Forearm supination: 4  Forearm pronation: 4    Right Elbow   Flexion: 5  Extension: 5  Forearm supination: 5  Forearm pronation: 5    Left Wrist/Hand      (2nd hand position)     Trial 1: 60    Trial 2: 55    Trial 3: 60    Right Wrist/Hand      (2nd hand position)     Trial 1: 80    Trial 2: 75    Trial 3: 80    Additional Strength Details  Tested within limited ranges               Eval/ Re-eval Auth #/ Referral # Total visits Start date  Expiration date Total active units  Total manual units  PT only or  PT+OT? 8/30  IE                                    Precautions:  Significant LBP and history, 2 stents placed in 2020, Hypertension,  Left TSA 8/18/22,      TSA protocol    Specialty Daily Treatment Diary       Manuals 11/23 11/21 11/17 11/15 11/10   Visit # 25 24 23 22 21   STM UT, pec group        PROM shld   All directions All directions    ST joint mobs        scap mobilizations     Flexion mob  [address] mobilizaitons   Post & inf gr  II-III Post & inf gr  II-III Post & inf gr   II-III   Warm-up                Neuro Re-Ed        Posture correct        Supine flex into ABC 2x through @ 110 1lb & 2lbs   1x through @120 1x through @120   rows        Scap squeeze        Serratus wobbles  YTB 3x30s YTB 2x30s     Isomets 4 way   YTB 20x ER/IR ea YTB 20x ER/IR    Wall ball circles  @90, 110 deg flex 30x cw/ccw @90, 110 deg flex 30x cw/ccw @90 deg flex 30x cw/ccw    Pulleys 1 min flex & abd 2 mins flex & abd 2 mins flex & abd 2 mins flex & abd 2 mins flex & abd   Supine D2   Standing at wall 2x10  3x10   Prone rows & ext        Ther Ex        Gripping        Corner pec str        Upper trap str        C/s rot str        Supine cane flex        Pendulums        Wand flex        Wand ER        Wand abd        Elbow sup/pron        Elbow flex / ext        Supine AA cane press-up        Wall slides        Standing AROM flex & abd  quin flex & abd 0 1 2x10 ea      quin biceps curls 10x 3 ways 5lbs 3x0 10x 3 ways 3 0 10x 3 ways    quin triceps ext  3 5 3x10 3 5 3x10 3 5 3x10    IR standing SOS str  20x5s 20x5s 20x5s    Functional ER into IR    2x10 2x10   Shoulder flexion cone/weight placement on shelf     5x w/cones 1st shelf  3x w/1&2lb weights 1st shelf   Flexion into horiz abd     +horiz add 2x10 @90 deg   Standing abd into ER  Up to 45 deg abd 2x10 Up to 45 deg abd 2x10     Standing B ER     2x10 L w/o cane AROM   Table slides        Ther Activity                                Modalities        CP                     Access Code: [email or web address]  URL: [email or web address]  Date: 09/22/2022  Prepared by:  Ragini Colorado

## 2022-11-29 ENCOUNTER — OFFICE VISIT (OUTPATIENT)
Dept: PHYSICAL THERAPY | Facility: CLINIC | Age: 77
End: 2022-11-29

## 2022-11-29 DIAGNOSIS — M25.512 CHRONIC LEFT SHOULDER PAIN: ICD-10-CM

## 2022-11-29 DIAGNOSIS — G89.29 CHRONIC LEFT SHOULDER PAIN: ICD-10-CM

## 2022-11-29 DIAGNOSIS — M19.012 LOCALIZED OSTEOARTHRITIS OF LEFT SHOULDER: Primary | ICD-10-CM

## 2022-11-29 NOTE — PROGRESS NOTES
Daily Note     Today's date: 2022  Patient name: Abi Lott  : 1945  MRN: [id number]  Referring provider: Mitchell Henson*  Dx:   Encounter Diagnosis     ICD-10-CM    1  Localized osteoarthritis of left shoulder  M19 012       2  Chronic left shoulder pain  M25 512     G89 29           Start Time: 1453  Stop Time: 1539  Total time in clinic (min): 46 minutes    Subjective: Client arrives with reports of continued pain along L side neck and L UE    Objective: See treatment diary below      Assessment: Client with good participation throughout session  He continues to present with decreased UE mobility, especially with ER  Initiated and trained on flex into abd to further improve dynamic transitions throughout LUE  Plan: Continue per plan of care  Eval/ Re-eval Auth #/ Referral # Total visits Start date  Expiration date Total active units  Total manual units  PT only or  PT+OT?   IE                                    Precautions:  Significant LBP and history, 2 stents placed in , Hypertension,  Left TSA 22,      TSA protocol    Specialty Daily Treatment Diary       Manuals 11/29 11/23 11/21 11/17 11/15 11/10   Visit # 26 25 24 23 22 21   STM UT, pec group         PROM shld    All directions All directions    ST joint mobs         scap mobilizations      Flexion mob  [facility] mobilizaitons    Post & inf gr  II-III Post & inf gr  II-III Post & inf gr   II-III   Warm-up                  Neuro Re-Ed         Posture correct         Supine flex into ABC  2x through @ 110 1lb & 2lbs   1x through @120 1x through @120   rows         Scap squeeze 2x15 sitting         Serratus wobbles   YTB 3x30s YTB 2x30s     Isomets 4 way    YTB 20x ER/IR ea YTB 20x ER/IR    Wall ball circles @90, 110 deg flex 30x cw/ccw  @90, 110 deg flex 30x cw/ccw @90, 110 deg flex 30x cw/ccw @90 deg flex 30x cw/ccw    Pulleys  1 min flex & abd 2 mins flex & abd 2 mins flex & abd 2 mins flex & abd 2 mins flex & abd   Supine D2 Standing at wall 2x12   Standing at wall 2x10  3x10   Prone rows & ext         Ther Ex         Gripping         Corner pec str         Upper trap str         C/s rot str         Supine cane flex         Pendulums         Wand flex         Wand ER Sitting ER at 90 2x10        Wand abd         Elbow sup/pron         Elbow flex / ext         Supine AA cane press-up         Wall slides         Standing AROM flex & abd AROM: flex into ABD 2x10    ABD into flex 2x10  quin flex & abd 0 1 2x10 ea      quin biceps curls At [employer]: level sup: 3 6 2x10   Pro: 1 5 2x10  Neutral: 2 0 2x10  10x 3 ways 5lbs 3x0 10x 3 ways 3 0 10x 3 ways    quin triceps ext 2 0 3x10  3 5 3x10 3 5 3x10 3 5 3x10    IR standing SOS str 20x5s  20x5s 20x5s 20x5s    Functional ER into IR     2x10 2x10   Shoulder flexion cone/weight placement on shelf      5x w/cones 1st shelf  3x w/1&2lb weights 1st shelf   Flexion into horiz abd      +horiz add 2x10 @90 deg   Standing abd into ER   Up to 45 deg abd 2x10 Up to 45 deg abd 2x10     Standing B ER      2x10 L w/o cane AROM   Table slides Wall slides 15x        Ther Activity                                    Modalities         CP                       Access Code: [email or web address]  URL: [email or web address]  Date: 09/22/2022  Prepared by:  Alberto Cordoba

## 2022-12-01 ENCOUNTER — OFFICE VISIT (OUTPATIENT)
Dept: PHYSICAL THERAPY | Facility: CLINIC | Age: 77
End: 2022-12-01

## 2022-12-01 DIAGNOSIS — M19.012 LOCALIZED OSTEOARTHRITIS OF LEFT SHOULDER: Primary | ICD-10-CM

## 2022-12-01 DIAGNOSIS — G89.29 CHRONIC LEFT SHOULDER PAIN: ICD-10-CM

## 2022-12-01 DIAGNOSIS — M25.512 CHRONIC LEFT SHOULDER PAIN: ICD-10-CM

## 2022-12-01 NOTE — PROGRESS NOTES
Daily Note     Today's date: 2022  Patient name: Nicho Soler  : 1945  MRN: [id number]  Referring provider: Tanika Nunn*  Dx:   Encounter Diagnosis     ICD-10-CM    1  Localized osteoarthritis of left shoulder  M19 012       2  Chronic left shoulder pain  M25 512     G89 29           Start Time: 0845  Stop Time: 0930  Total time in clinic (min): 45 minutes    Subjective: Pt reports he was able to drive himself today for the first time since August      Objective: See treatment diary below      Assessment: Client with good participation throughout session  ROM deficits with ER, improving in other directions  Able to tolerate strength exercises with no inc pain  Plan: Continue per plan of care  Eval/ Re-eval Auth #/ Referral # Total visits Start date  Expiration date Total active units  Total manual units  PT only or  PT+OT?   IE                                    Precautions:  Significant LBP and history, 2 stents placed in , Hypertension,  Left TSA 22,      TSA protocol    Specialty Daily Treatment Diary       Manuals 12/1 11/29 11/23 11/21 11/17 11/15   Visit # 27 26 25 24 23 22   STM UT, pec group         PROM shld     All directions All directions   ST joint mobs         scap mobilizations         GH mobilizaitons     Post & inf gr  II-III Post & inf gr   II-III   Warm-up         MH         Neuro Re-Ed         Posture correct         Supine flex into ABC 2x Alpha 2#  2x through @ 110 1lb & 2lbs   1x through @120   rows         Scap squeeze 30x 2x15 sitting        Serratus wobbles    YTB 3x30s YTB 2x30s    Isomets 4 way     YTB 20x ER/IR ea YTB 20x ER/IR   Wall ball circles 90/110 flex 30x cw/ccw @90, 110 deg flex 30x cw/ccw  @90, 110 deg flex 30x cw/ccw @90, 110 deg flex 30x cw/ccw @90 deg flex 30x cw/ccw   Pulleys 1 min  1 min flex & abd 2 mins flex & abd 2 mins flex & abd 2 mins flex & abd   Supine D2 Standing at wall 2x12 Standing at wall 2x12   Standing at wall 2x10    Prone rows & ext         Ther Ex         Gripping         Corner pec str         Upper trap str         C/s rot str         Supine cane flex         Pendulums         Wand flex 20x        Wand ER ER to 90 2x10 Sitting ER at 90 2x10       Wand abd         Elbow sup/pron         Elbow flex / ext         Supine AA cane press-up         Wall slides         Standing AROM flex & abd Seated 1# flex and flex into abd 2x10 AROM: flex into ABD 2x10    ABD into flex 2x10  quin flex & abd 0 1 2x10 ea     quin biceps curls 2x10 5# 3 ways At [employer]: level sup: 3 6 2x10   Pro: 1 5 2x10  Neutral: 2 0 2x10  10x 3 ways 5lbs 3x0 10x 3 ways 3 0 10x 3 ways   quin triceps ext 3 5 3x10 2 0 3x10  3 5 3x10 3 5 3x10 3 5 3x10   IR standing SOS str 20x5s 20x5s  20x5s 20x5s 20x5s   Functional ER into IR      2x10   Shoulder flexion cone/weight placement on shelf         Flexion into horiz abd         Standing abd into ER    Up to 45 deg abd 2x10 Up to 45 deg abd 2x10    Standing B ER         Table slides Wall slides 2x10 Wall slides 15x       Ther Activity                                    Modalities         CP                       Access Code: [email or web address]  URL: [email or web address]  Date: 09/22/2022  Prepared by:  Jory Saavedra

## 2022-12-06 ENCOUNTER — OFFICE VISIT (OUTPATIENT)
Dept: PHYSICAL THERAPY | Facility: CLINIC | Age: 77
End: 2022-12-06

## 2022-12-06 DIAGNOSIS — M19.012 LOCALIZED OSTEOARTHRITIS OF LEFT SHOULDER: Primary | ICD-10-CM

## 2022-12-06 DIAGNOSIS — G89.29 CHRONIC LEFT SHOULDER PAIN: ICD-10-CM

## 2022-12-06 DIAGNOSIS — M25.512 CHRONIC LEFT SHOULDER PAIN: ICD-10-CM

## 2022-12-06 NOTE — PROGRESS NOTES
Daily Note     Today's date: 2022  Patient name: Duane Crespo  : 1945  MRN: [id number]  Referring provider: Aissatou Wong  Dx:   Encounter Diagnosis     ICD-10-CM    1  Localized osteoarthritis of left shoulder  M19 012       2  Chronic left shoulder pain  M25 512     G89 29           Start Time: 851  Stop Time: 938  Total time in clinic (min): 47 minutes    Subjective: Client arrived with reports of his continued L shoulder pain, but feels it is getting stronger  He stated washing his hair is difficulty to do  Objective: See treatment diary below      Assessment: Client with good participation throughout session  He continues to present with difficulties with ER and abduction  Initiated and trained on hair washing simulation to address functional limitations for hair washing  Plan: Continue per plan of care  Eval/ Re-eval Auth #/ Referral # Total visits Start date  Expiration date Total active units  Total manual units  PT only or  PT+OT?   IE                                    Precautions:  Significant LBP and history, 2 stents placed in , Hypertension,  Left TSA 22,      TSA protocol    Specialty Daily Treatment Diary       Manuals 12/6 12/1 11/29 11/23 11/21 11/17 11/15   Visit # 28 27 26 25 24 23 22   STM UT, pec group          PROM shld      All directions All directions   ST joint mobs          scap mobilizations          GH mobilizaitons      Post & inf gr  II-III Post & inf gr   II-III   Warm-up          MH          Neuro Re-Ed          Posture correct          Supine flex into ABC  2x Alpha 2#  2x through @ 110 1lb & 2lbs   1x through @120   rows          Scap squeeze 30x 30x 2x15 sitting        Serratus wobbles     YTB 3x30s YTB 2x30s    Isomets 4 way      YTB 20x ER/IR ea YTB 20x ER/IR   Wall ball circles 90/110 flex 30x cw/ccw 90/110 flex 30x cw/ccw @90, 110 deg flex 30x cw/ccw  @90, 110 deg flex 30x cw/ccw @90, 110 deg flex 30x cw/ccw @90 deg flex 30x cw/ccw   Pulleys 2 min flex/abd 1 min  1 min flex & abd 2 mins flex & abd 2 mins flex & abd 2 mins flex & abd   Supine D2 Standing at wall 2x12 Standing at wall 2x12 Standing at wall 2x12   Standing at wall 2x10    Prone rows & ext          Ther Ex          Gripping          Corner pec str          Upper trap str          C/s rot str          Supine cane flex          Pendulums          Wand flex  20x        Wand ER  ER to 90 2x10 Sitting ER at 90 2x10       Wand abd          Elbow sup/pron          Elbow flex / ext          Supine AA cane press-up          Wall slides          Standing AROM flex & abd  Seated 1# flex and flex into abd 2x10 AROM: flex into ABD 2x10    ABD into flex 2x10  quin flex & abd 0 1 2x10 ea     quin biceps curls 2x12 5# dumbell 2x10 5# 3 ways At [employer]: level sup: 3 6 2x10   Pro: 1 5 2x10  Neutral: 2 0 2x10  10x 3 ways 5lbs 3x0 10x 3 ways 3 0 10x 3 ways   quin triceps ext 3 5 2x12 3 5 3x10 2 0 3x10  3 5 3x10 3 5 3x10 3 5 3x10   IR standing SOS str  20x5s 20x5s  20x5s 20x5s 20x5s   Functional ER into IR Functional ER to behind the head 15x      2x10   Shoulder flexion cone/weight placement on shelf          Flexion into horiz abd 15x         Standing abd into ER 15x    Up to 45 deg abd 2x10 Up to 45 deg abd 2x10    Standing B ER Yellow tband LUE only 2x10    "no money" 20x         Table slides Wall slides 15 Wall slides 2x10 Wall slides 15x       Ther Activity                                        Modalities          CP                         Access Code: [email or web address]  URL: [email or web address]  Date: 09/22/2022  Prepared by:  Shawna Holm

## 2022-12-08 ENCOUNTER — APPOINTMENT (OUTPATIENT)
Dept: PHYSICAL THERAPY | Facility: CLINIC | Age: 77
End: 2022-12-08

## 2022-12-13 ENCOUNTER — OFFICE VISIT (OUTPATIENT)
Dept: PHYSICAL THERAPY | Facility: CLINIC | Age: 77
End: 2022-12-13

## 2022-12-13 DIAGNOSIS — G89.29 CHRONIC LEFT SHOULDER PAIN: ICD-10-CM

## 2022-12-13 DIAGNOSIS — M25.512 CHRONIC LEFT SHOULDER PAIN: ICD-10-CM

## 2022-12-13 DIAGNOSIS — M19.012 LOCALIZED OSTEOARTHRITIS OF LEFT SHOULDER: Primary | ICD-10-CM

## 2022-12-13 NOTE — PROGRESS NOTES
Daily Note     Today's date: 2022  Patient name: Jas Mcgregor  : 1945  MRN: [id number]  Referring provider: Senait Hudson*  Dx:   Encounter Diagnosis     ICD-10-CM    1  Localized osteoarthritis of left shoulder  M19 012       2  Chronic left shoulder pain  M25 512     G89 29           Start Time: 845  Stop Time: 930  Total time in clinic (min): 45 minutes    Subjective: Pt reports that his shoulder is feeling better, and he feels better since last week when he was having complications with some of his medication  Pt continues to have dull pain in his shoulder into his neck depending on his shoulder movement, however continues to gain strength  Pt still feels limited with his movement overall, which creates difficulty with some of his self care and ADLs  Objective: See treatment diary below      Assessment: Tolerated treatment well  Patient demonstrated fatigue post treatment, exhibited good technique with therapeutic exercises and would benefit from continued PT  Pt's GH flex looks stronger and smoother, however continues to be restricted with how far he can go  Pt continues with most difficulty in ER & IR  Pt trialed bodyblade to improve strengthening, good tolerance and fatigued quickly  Continue to work on rotational movement and strengthening as well as increasing end range motion  Plan: Continue per plan of care  Progress treatment as tolerated  Eval/ Re-eval Auth #/ Referral # Total visits Start date  Expiration date Total active units  Total manual units  PT only or  PT+OT?      IE                                    Precautions:  Significant LBP and history, 2 stents placed in , Hypertension,  Left TSA 22,      TSA protocol    Specialty Daily Treatment Diary       Manuals    Visit # 29 28 27 26 25   STM UT, pec group        PROM shld        ST joint mobs        scap mobilizations        GH mobilizaitons        Warm-up MH        Neuro Re-Ed        Posture correct        Supine flex into ABC   2x Alpha 2#  2x through @ 110 1lb & 2lbs   rows        Scap squeeze  30x 30x 2x15 sitting     Serratus wobbles        Isomets 4 way        Wall ball circles  90/110 flex 30x cw/ccw 90/110 flex 30x cw/ccw @90, 110 deg flex 30x cw/ccw    Pulleys 2 mins flex/abd 2 min flex/abd 1 min  1 min flex & abd   Supine D2 Standing at wall 3x10 Standing at wall 2x12 Standing at wall 2x12 Standing at wall 2x12    bodyblade Neutral 2x30s  90 flex 30s       Dynamic stabilization in UE supported ER 20x       Prone rows & ext        Ther Ex        Gripping        Corner pec str        Upper trap str        C/s rot str        Supine cane flex        Pendulums        Wand flex   20x     Wand ER   ER to 90 2x10 Sitting ER at 90 2x10    Wand abd        Elbow sup/pron        Elbow flex / ext        Supine AA cane press-up        Wall slides        Standing AROM flex & abd   Seated 1# flex and flex into abd 2x10 AROM: flex into ABD 2x10    ABD into flex 2x10    quin biceps curls 3x10 3 ways 7# 2x12 5# dumbell 2x10 5# 3 ways At [facility]: level sup: 3 6 2x10   Pro: 1 5 2x10  Neutral: 2 0 2x10    quin triceps ext  3 5 2x12 3 5 3x10 2 0 3x10    IR standing SOS str   20x5s 20x5s    Functional ER into IR ER into IR 2x10, 5s hold Functional ER to behind the head 15x      Shoulder flexion cone/weight placement on shelf        Flexion into horiz abd 2x10 15x      Standing abd into ER 2x10 15x      Standing B ER  Yellow tband LUE only 2x10    "no money" 20x      Table slides Wall slides 20x Wall slides 15 Wall slides 2x10 Wall slides 15x    Ther Activity                                Modalities        CP                     Access Code: [email or web address]  URL: [email or web address]  Date: 09/22/2022  Prepared by:  Arvind Frye

## 2022-12-15 ENCOUNTER — OFFICE VISIT (OUTPATIENT)
Dept: PHYSICAL THERAPY | Facility: CLINIC | Age: 77
End: 2022-12-15

## 2022-12-15 DIAGNOSIS — G89.29 CHRONIC LEFT SHOULDER PAIN: ICD-10-CM

## 2022-12-15 DIAGNOSIS — M25.512 CHRONIC LEFT SHOULDER PAIN: ICD-10-CM

## 2022-12-15 DIAGNOSIS — M19.012 LOCALIZED OSTEOARTHRITIS OF LEFT SHOULDER: Primary | ICD-10-CM

## 2022-12-15 NOTE — PROGRESS NOTES
Daily Note     Today's date: 12/15/2022  Patient name: Andrea Koo  : 1945  MRN: [id number]  Referring provider: Navneet Puente*  Dx:   Encounter Diagnosis     ICD-10-CM    1  Localized osteoarthritis of left shoulder  M19 012       2  Chronic left shoulder pain  M25 512     G89 29           Start Time: 0847  Stop Time: 930  Total time in clinic (min): 43 minutes    Subjective: Pt reports that the pain is always there in the shoulder but it is typically low level pain  Pt states his motion is always restricted but feels it gets better over time  No new complaints  Objective: See treatment diary below      Assessment: Tolerated treatment well  Patient demonstrated fatigue post treatment, exhibited good technique with therapeutic exercises and would benefit from continued PT      Plan: Continue per plan of care  Progress treatment as tolerated  Eval/ Re-eval Auth #/ Referral # Total visits Start date  Expiration date Total active units  Total manual units  PT only or  PT+OT?      IE                                    Precautions:  Significant LBP and history, 2 stents placed in , Hypertension,  Left TSA 22,      TSA protocol    Specialty Daily Treatment Diary       Manuals 12/15 12/13 12/6 12/1 11/29   Visit # 30 29 28 27 26   STM UT, pec group        PROM shld        ST joint mobs        scap mobilizations        GH mobilizaitons        Warm-up        MH        Neuro Re-Ed        Posture correct        Supine flex into ABC    2x Alpha 2#    rows        Scap squeeze   30x 30x 2x15 sitting    Serratus wobbles        Isomets 4 way        Wall ball circles   90/110 flex 30x cw/ccw 90/110 flex 30x cw/ccw @90, 110 deg flex 30x cw/ccw   Pulleys 2 mins flex/abd 2 mins flex/abd 2 min flex/abd 1 min    Supine D2  Standing at wall 3x10 Standing at wall 2x12 Standing at wall 2x12 Standing at wall 2x12   bodyblade Neutral 2x30s  90 flex H+V 30s ea  abd & ER 30s Neutral 2x30s  90 flex 30s      Dynamic stabilization in UE supported ER 20x 20x      Prone rows & ext        Ther Ex        Gripping        Corner pec str        Upper trap str        C/s rot str        Supine cane flex        Pendulums        Wand flex    20x    Wand ER    ER to 90 2x10 Sitting ER at 90 2x10   Wand abd        Elbow sup/pron        Elbow flex / ext        Supine AA cane press-up        Wall slides        Standing AROM flex & abd    Seated 1# flex and flex into abd 2x10 AROM: flex into ABD 2x10    ABD into flex 2x10   quin biceps curls 3x10 3 ways 7# 3x10 3 ways 7# 2x12 5# dumbell 2x10 5# 3 ways At [facility]: level sup: 3 6 2x10   Pro: 1 5 2x10  Neutral: 2 0 2x10   quin triceps ext   3 5 2x12 3 5 3x10 2 0 3x10   IR standing SOS str    20x5s 20x5s   Functional ER into IR ER into IR 2x10, 5s hold ER into IR 2x10, 5s hold Functional ER to behind the head 15x     Shoulder flexion cone/weight placement on shelf        Flexion into horiz abd  2x10 15x     Standing abd into ER 2x10 2x10 15x     Reverse flys 0 7 2x10       pec flys 0 7 2x10       Standing B ER   Yellow tband LUE only 2x10    "no money" 20x     Table slides Wall slides 20x w/lift Wall slides 20x Wall slides 15 Wall slides 2x10 Wall slides 15x   Ther Activity                                Modalities        CP                     Access Code: [email or web address]  URL: [email or web address]  Date: 09/22/2022  Prepared by:  Jordi Fisher

## 2022-12-20 ENCOUNTER — OFFICE VISIT (OUTPATIENT)
Dept: PHYSICAL THERAPY | Facility: CLINIC | Age: 77
End: 2022-12-20

## 2022-12-20 DIAGNOSIS — G89.29 CHRONIC LEFT SHOULDER PAIN: ICD-10-CM

## 2022-12-20 DIAGNOSIS — M25.512 CHRONIC LEFT SHOULDER PAIN: ICD-10-CM

## 2022-12-20 DIAGNOSIS — M19.012 LOCALIZED OSTEOARTHRITIS OF LEFT SHOULDER: Primary | ICD-10-CM

## 2022-12-20 NOTE — PROGRESS NOTES
Daily Note     Today's date: 2022  Patient name: Amina Grant  : 1945  MRN: [id number]  Referring provider: Daly Montes De Oca  Dx:   Encounter Diagnosis     ICD-10-CM    1  Localized osteoarthritis of left shoulder  M19 012       2  Chronic left shoulder pain  M25 512     G89 29           Start Time: 1015  Stop Time: 1100  Total time in clinic (min): 45 minutes    Subjective: Amina Grant states he has been experiecing increased anterior shoulder pain and continues with poor mobility of shoulder  He states he will have increased pain when reaching in wrong direction or following prolonged periods of sitting/inactivity  Objective: See treatment diary below      Assessment: Tolerated treatment well  Patient requires postural cues throughout session and demod improved recruitment of posterior cuff with focus on scapular stabilizers and reduced anterior shoulder pain  He does however continue with significant lack in PROM and low RTC strength/endurance  Plan: Continue per plan of care  Eval/ Re-eval Auth #/ Referral # Total visits Start date  Expiration date Total active units  Total manual units  PT only or  PT+OT?      IE                                    Precautions:  Significant LBP and history, 2 stents placed in , Hypertension,  Left TSA 22,      TSA protocol    Specialty Daily Treatment Diary       Manuals 12/20 12/15 12/13 12/6 12/1   Visit # 31 30 29 28 27   STM UT, pec group        PROM shld All directions       ST joint mobs        scap mobilizations        GH mobilizaitons        Warm-up                Neuro Re-Ed        Posture correct        Supine flex into ABC     2x Alpha 2#   rows        Scap squeeze Slouch correct 15x5s   30x 30x   Standing flexion/abd to 90 Prior to scap hiking and cues for scap retraction 2x10 ea       Isomets 4 way        Wall ball circles    90/110 flex 30x cw/ccw 90/110 flex 30x cw/ccw   Pulleys 2 mins flex/abd 2 mins flex/abd 2 mins flex/abd 2 min flex/abd 1 min   Supine D2   Standing at wall 3x10 Standing at wall 2x12 Standing at wall 2x12   bodyblade  Neutral 2x30s  90 flex H+V 30s ea  abd & ER 30s Neutral 2x30s  90 flex 30s     Dynamic stabilization in UE supported ER  20x 20x     Sl GH flexion 2x10 cues for scap retr       SL ER 3x10  Cues for scap retr       Ther Ex        No Money 2x10 5s               Upper trap str        C/s rot str        Supine cane flex        Pendulums        Wand flex     20x   Wand ER     ER to 90 2x10   Wand abd        Elbow sup/pron        Elbow flex / ext        Supine AA cane press-up        Wall slides        Standing AROM flex & abd     Seated 1# flex and flex into abd 2x10   quin biceps curls  3x10 3 ways 7# 3x10 3 ways 7# 2x12 5# dumbell 2x10 5# 3 ways   quin triceps ext    3 5 2x12 3 5 3x10   IR standing SOS str     20x5s   Functional ER into IR  ER into IR 2x10, 5s hold ER into IR 2x10, 5s hold Functional ER to behind the head 15x    Shoulder flexion cone/weight placement on shelf        Flexion into horiz abd   2x10 15x    Standing abd into ER  2x10 2x10 15x    Reverse flys  0 7 2x10      pec flys  0 7 2x10      Standing B ER    Yellow tband LUE only 2x10    "no money" 20x    Table slides  Wall slides 20x w/lift Wall slides 20x Wall slides 15 Wall slides 2x10   Ther Activity                                Modalities        CP                     Access Code: [email or web address]  URL: [email or web address]  Date: 09/22/2022  Prepared by:  Tracee Singh

## 2022-12-22 ENCOUNTER — OFFICE VISIT (OUTPATIENT)
Dept: PHYSICAL THERAPY | Facility: CLINIC | Age: 77
End: 2022-12-22

## 2022-12-22 DIAGNOSIS — M25.512 CHRONIC LEFT SHOULDER PAIN: ICD-10-CM

## 2022-12-22 DIAGNOSIS — G89.29 CHRONIC LEFT SHOULDER PAIN: ICD-10-CM

## 2022-12-22 DIAGNOSIS — M19.012 LOCALIZED OSTEOARTHRITIS OF LEFT SHOULDER: Primary | ICD-10-CM

## 2022-12-22 NOTE — PROGRESS NOTES
Daily Note     Today's date: 2022  Patient name: Charla Lira  : 1945  MRN: [id number]  Referring provider: Annabelle Anguiano*  Dx:   Encounter Diagnosis     ICD-10-CM    1  Localized osteoarthritis of left shoulder  M19 012       2  Chronic left shoulder pain  M25 512     G89 29                      Subjective: Pt reports inc stiffness when "not using it"        Objective: See treatment diary below      Assessment: Tolerated treatment well  Patient Demo good form with AROM with no compensation with [employer]      Plan: Continue per plan of care  Eval/ Re-eval Auth #/ Referral # Total visits Start date  Expiration date Total active units  Total manual units  PT only or  PT+OT?   IE                                    Precautions:  Significant LBP and history, 2 stents placed in , Hypertension,  Left TSA 22,      TSA protocol    Specialty Daily Treatment Diary       Manuals 12/22 12/20 12/15 12/13 12/6   Visit # 32 31 30 29 28   STM UT, pec group        PROM shld All directions All directions      ST joint mobs        scap mobilizations        GH mobilizaitons        Warm-up                Neuro Re-Ed        Posture correct        Supine flex into ABC        rows        Scap squeeze  Slouch correct 15x5s   30x   Standing flexion/abd to 90  Prior to scap hiking and cues for scap retraction 2x10 ea      Isomets 4 way        Wall ball circles     90/110 flex 30x cw/ccw   Pulleys 2 min flex  abd 2 mins flex/abd 2 mins flex/abd 2 mins flex/abd 2 min flex/abd   Supine D2    Standing at wall 3x10 Standing at wall 2x12   bodyblade Neutral 2x30s  90 flex H+V 30s ea  abd & ER 30s  Neutral 2x30s  90 flex H+V 30s ea  abd & ER 30s Neutral 2x30s  90 flex 30s    Dynamic stabilization in UE supported ER   20x 20x    Sl GH flexion  2x10 cues for scap retr      SL ER  3x10  Cues for scap retr      Ther Ex        No Money 2x10 5s 2x10 5s              Upper trap str        C/s rot str Supine cane flex        Pendulums        Wand flex        Wand ER        Wand abd        Elbow sup/pron        Elbow flex / ext        Supine AA cane press-up        Wall slides        Standing AROM flex & abd        max biceps curls Max 5 0 3 way  3x10 3 ways 7# 3x10 3 ways 7# 2x12 5# dumbell   max triceps ext     3 5 2x12   IR standing SOS str        Functional ER into IR [place] w/ rotation 2 0 20x  ER into IR 2x10, 5s hold ER into IR 2x10, 5s hold Functional ER to behind the head 15x   Shoulder flexion cone/weight placement on shelf        Flexion into horiz abd    2x10 15x   Standing abd into ER   2x10 2x10 15x   Reverse flys   0 7 2x10     pec flys   0 7 2x10     Standing B ER     Yellow tband LUE only 2x10    "no money" 20x   Table slides   Wall slides 20x w/lift Wall slides 20x Wall slides 15   Ther Activity                                Modalities        CP                     Access Code: [email or web address]  URL: [email or web address]  Date: 09/22/2022  Prepared by:  Mai Doherty

## 2022-12-27 ENCOUNTER — OFFICE VISIT (OUTPATIENT)
Dept: PHYSICAL THERAPY | Facility: CLINIC | Age: 77
End: 2022-12-27

## 2022-12-27 DIAGNOSIS — M19.012 LOCALIZED OSTEOARTHRITIS OF LEFT SHOULDER: Primary | ICD-10-CM

## 2022-12-27 DIAGNOSIS — G89.29 CHRONIC LEFT SHOULDER PAIN: ICD-10-CM

## 2022-12-27 DIAGNOSIS — M25.512 CHRONIC LEFT SHOULDER PAIN: ICD-10-CM

## 2022-12-27 NOTE — PROGRESS NOTES
PT Re-Evaluation     Today's date: 2022  Patient name: Angela Baker  : 1945  MRN: [id number]  Referring provider: Lindsey Ayala*  Dx:   Encounter Diagnosis     ICD-10-CM    1  Localized osteoarthritis of left shoulder  M19 012       2  Chronic left shoulder pain  M25 512     G89 29           Start Time: 0845  Stop Time: 930  Total time in clinic (min): 45 minutes    Assessment  Assessment details: Angela Baker is a 76 y o  male who presents to skilled OPPT for his 33rd visit s/p L total shoulder arthroplasty with biceps tenodesis  Pt presents with slowly decreasing, but constant, pain, minimal improvements with L GH ROM, impaired function, improved L shoulder MMT without increasing pain, and improving activity tolerance and function  Pt is making slow, but minimal, improvements over the last month  Pt continues with compensations with his biceps help with shoulder flexion, needs cueing to utilize RTC more  Pt appears to have hit a plateau recently and was educated to continue with his HEP to consistently stretch as well as slowly improve L shoulder strength  Pt's remaining pain and deficits are preventing him from performing self care, ADL's, and recreational activities without help or compensations  Pt would continue to benefit from skilled physical therapy services to decrease his pain, address his deficits, progress towards his goals, progress through his post-surgical protocol, and return to his PLOF  Pt will continue for the next few sessions while emphasizing the importance of his HEP in anticipation for possible discharge in the next few weeks if no more improvements are seen  Impairments: abnormal muscle firing, abnormal or restricted ROM, abnormal movement, impaired physical strength, pain with function, scapular dyskinesis, poor posture  and poor body mechanics  Understanding of Dx/Px/POC: good   Prognosis: good    Goals  Short term goals:  (6 weeks)  1   Patient will have pain level 2/10 left shoulder at rest- Met  2  Patient will report a 50% improvement in symptoms with ADL's- Met  3  Patient will demonstrate appropriate scapulohumeral rhythm with reaching shoulder height and below- Progressing  4  Patient will have improved left shoulder ROM by 20 degrees- Met    Long term goals: (12 weeks)  1  Patient will report 85 % improvement improvement in symptoms with ADL's- Met  2  Patient will have pain level 2/10 left shoulder with ADL's - Progressing  3  Patient will demonstrate appropriate scapulohumeral rhythm with reaching overhead- Progressing  4  Patient will be independent in a comprehensive home exercise program- Progressing      Plan  Patient would benefit from: PT eval and skilled physical therapy  Planned modality interventions: cryotherapy and thermotherapy: hydrocollator packs  Planned therapy interventions: joint mobilization, manual therapy, massage, neuromuscular re-education, patient education, postural training, strengthening, stretching, therapeutic activities, ADL training, functional ROM exercises, home exercise program, abdominal trunk stabilization and therapeutic exercise  Frequency: 1-2x/week  Duration in weeks: 6  Treatment plan discussed with: patient        Subjective Evaluation    History of Present Illness  Date of surgery: 8/18/2022  Mechanism of injury: surgery  Mechanism of injury: 12/27/22 Update:  Pt states a constant ache in his shoulder throughout the day, always worse in the morning but gets better as the day progresses  Pt thinks he has better ROM in his shoulder over the last month  The biggest problem is the constant dull pain, sitting in his computer chair can be very painful for his arm from the position  Pt states the strength in his shoulder is slowly getting better  His HEP and shoulder exercises feel like they are getting easier  Pt is able to sleep in bed more now, not waking up as much from the pain  No problem driving   Pt is able to bathe and get dressed much easier, still difficult with coats but he has managed  Pain  Current pain rating: 3  At best pain ratin  At worst pain ratin  Location: base of neck on the L down to the hand  Quality: dull ache and tight  Relieving factors: rest  Aggravating factors: lifting and overhead activity  Progression: improved    Social Support  Lives with: spouse    Employment status: not working (retired)  Hand dominance: right  Exercise history: Walking    Patient Goals  Patient goals for therapy: decreased pain and independence with ADLs/IADLs  Patient goal: driving        Objective     Postural Observations  Seated posture: fair  Standing posture: fair        Palpation   Left   Hypertonic in the pectoralis minor and upper trapezius  Tenderness of the pectoralis minor and upper trapezius       Active Range of Motion   Cervical/Thoracic Spine       Cervical    Flexion: 60 degrees   Extension: 40 degrees     with pain  Left lateral flexion: 18 degrees      Right lateral flexion: 16 degrees      Left rotation: 40 degrees  Right rotation: 60 degrees         Left Shoulder   Flexion: 100 degrees   Abduction: 92 degrees   External rotation BTH: C5   Internal rotation BTB: L2     Right Shoulder   Flexion: 122 degrees   Abduction: 118 degrees   External rotation BTH: C7   Internal rotation BTB: T9     Left Elbow   Flexion: 140 degrees   Extension: 15 degrees   Forearm supination: 85 degrees   Forearm pronation: 92 degrees     Passive Range of Motion   Left Shoulder   Flexion: 112 degrees   Abduction: 92 degrees   External rotation 45°: 48 degrees   External rotation 90°: 38 degrees   Internal rotation 45°: 65 degrees   Internal rotation 90°: 49 degrees     Right Shoulder   Flexion: 145 degrees   Abduction: 140 degrees   External rotation 0°: 72 degrees   Internal rotation 0°: 85 degrees     Scapular Mobility   Left Shoulder   Scapular mobility: fair    Strength/Myotome Testing     Left Shoulder Planes of Motion   Flexion: 4   Abduction: 4   External rotation at 0°: 4-   Internal rotation at 0°: 4+     Right Shoulder     Planes of Motion   Flexion: 4+   Abduction: 4+   External rotation at 0°: 4+   Internal rotation at 0°: 4+     Left Elbow   Flexion: 4+  Extension: 4+  Forearm supination: 4  Forearm pronation: 4    Right Elbow   Flexion: 5  Extension: 5  Forearm supination: 5  Forearm pronation: 5    Additional Strength Details  Tested within limited ranges   strength not tested today               Eval/ Re-eval Auth #/ Referral # Total visits Start date  Expiration date Total active units  Total manual units  PT only or  PT+OT? 8/30  IE                                    Precautions:  Significant LBP and history, 2 stents placed in 2020, Hypertension,  Left TSA 8/18/22,      TSA protocol    Specialty Daily Treatment Diary       Manuals 12/27 12/22 12/20 12/15 12/13   Visit # 33 32 31 30 29   STM UT, pec group        PROM shld  All directions All directions     ST joint mobs        scap mobilizations        GH mobilizaitons        Warm-up                Neuro Re-Ed        Posture correct        Supine flex into ABC        rows        Scap squeeze   Slouch correct 15x5s     Standing flexion/abd to 90   Prior to scap hiking and cues for scap retraction 2x10 ea     Isomets 4 way Standing ER/IR 1 0 2x10 ea       Wall ball circles        Pulleys  2 min flex  abd 2 mins flex/abd 2 mins flex/abd 2 mins flex/abd   Supine D2     Standing at wall 3x10   bodyblade  Neutral 2x30s  90 flex H+V 30s ea  abd & ER 30s  Neutral 2x30s  90 flex H+V 30s ea  abd & ER 30s Neutral 2x30s  90 flex 30s   Dynamic stabilization in UE supported ER    20x 20x   Sl GH flexion   2x10 cues for scap retr     SL ER   3x10  Cues for scap retr     Ther Ex        No Money  2x10 5s 2x10 5s             Upper trap str        C/s rot str        Supine cane flex        Pendulums        Wand flex        Wand ER        Wand abd        Elbow sup/pron        Elbow flex / ext        Supine AA cane press-up        Wall slides        Standing AROM flex & abd        quin biceps curls  Kim[place] 5 0 3 way  3x10 3 ways 7# 3x10 3 ways 7#   quin triceps ext        IR standing SOS str 15x5s       Functional ER into IR  [place] w/ rotation 2 0 20x  ER into IR 2x10, 5s hold ER into IR 2x10, 5s hold   Shoulder flexion cone/weight placement on shelf        Flexion into horiz abd     2x10   Standing abd into ER    2x10 2x10   Reverse flys    0 7 2x10    pec flys    0 7 2x10    Standing B ER        Table slides    Wall slides 20x w/lift Wall slides 20x   Ther Activity                                Modalities        CP                     Access Code: [email or web address]  URL: [email or web address]  Date: 09/22/2022  Prepared by:  Mesfin Mccall

## 2022-12-29 ENCOUNTER — OFFICE VISIT (OUTPATIENT)
Dept: PHYSICAL THERAPY | Facility: CLINIC | Age: 77
End: 2022-12-29

## 2022-12-29 DIAGNOSIS — M25.512 CHRONIC LEFT SHOULDER PAIN: ICD-10-CM

## 2022-12-29 DIAGNOSIS — G89.29 CHRONIC LEFT SHOULDER PAIN: ICD-10-CM

## 2022-12-29 DIAGNOSIS — M19.012 LOCALIZED OSTEOARTHRITIS OF LEFT SHOULDER: Primary | ICD-10-CM

## 2022-12-29 NOTE — PROGRESS NOTES
Daily Note     Today's date: 2022  Patient name: Yvonne Edward  : 1945  MRN: [id number]  Referring provider: Sienna *  Dx:   Encounter Diagnosis     ICD-10-CM    1  Localized osteoarthritis of left shoulder  M19 012       2  Chronic left shoulder pain  M25 512     G89 29           Start Time: 0845  Stop Time: 0930  Total time in clinic (min): 45 minutes    Subjective: Pt reports his functional motion has improved, still has difficulty with       Objective: See treatment diary below      Assessment: Tolerated treatment well  Patient demonstrated fatigue post treatment, exhibited good technique with therapeutic exercises and would benefit from continued PT  Plan: Continue per plan of care  Eval/ Re-eval Auth #/ Referral # Total visits Start date  Expiration date Total active units  Total manual units  PT only or  PT+OT?   IE                                    Precautions:  Significant LBP and history, 2 stents placed in , Hypertension,  Left TSA 22,      TSA protocol    Specialty Daily Treatment Diary       Manuals 12/29 12/27 12/22 12/20 12/15   Visit # 29 33 28 31 30   STM UT, pec group        PROM shld All directions  All directions All directions    ST joint mobs        scap mobilizations        GH mobilizaitons        Warm-up        MH        Neuro Re-Ed        Posture correct        Supine flex into ABC        rows        Scap squeeze    Slouch correct 15x5s    Standing flexion/abd to 90 1# scaption/abd  2x10   Prior to scap hiking and cues for scap retraction 2x10 ea    Isomets 4 way Standing ER/IR 1 0 2x10 ea Standing ER/IR 1 0 2x10 ea      Wall ball circles        Pulleys 2 min flex/abd  2 min flex  abd 2 mins flex/abd 2 mins flex/abd   Supine D2        bodyblade Neutral 3x30s  PNF D2 3x30s  Neutral 2x30s  90 flex H+V 30s ea  abd & ER 30s  Neutral 2x30s  90 flex H+V 30s ea  abd & ER 30s   Dynamic stabilization in UE supported ER     20x   Sl GH flexion Standing Uni shld flex YTB 2x10   2x10 cues for scap retr    SL ER    3x10  Cues for scap retr    Ther Ex        No Money YTB 2x10 5s  2x10 5s 2x10 5s            Upper trap str        C/s rot str        Supine cane flex        Pendulums        Wand flex        Wand ER        Wand abd        Elbow sup/pron        Elbow flex / ext        Supine AA cane press-up        Wall slides        Standing AROM flex & abd        max biceps curls   Max 5 0 3 way  3x10 3 ways 7#   max triceps ext        IR standing SOS str  15x5s      Functional ER into IR   [place] w/ rotation 2 0 20x  ER into IR 2x10, 5s hold   Shoulder flexion cone/weight placement on shelf        Flexion into horiz abd        Standing abd into ER     2x10   Reverse flys     0 7 2x10   pec flys     0 7 2x10   Standing B ER        Table slides     Wall slides 20x w/lift   Ther Activity                                Modalities        CP                     Access Code: [email or web address]  URL: [email or web address]  Date: 09/22/2022  Prepared by:  Ellyn Heaton

## 2023-01-03 ENCOUNTER — OFFICE VISIT (OUTPATIENT)
Dept: PHYSICAL THERAPY | Facility: CLINIC | Age: 78
End: 2023-01-03

## 2023-01-03 DIAGNOSIS — G89.29 CHRONIC LEFT SHOULDER PAIN: ICD-10-CM

## 2023-01-03 DIAGNOSIS — M19.012 LOCALIZED OSTEOARTHRITIS OF LEFT SHOULDER: Primary | ICD-10-CM

## 2023-01-03 DIAGNOSIS — M25.512 CHRONIC LEFT SHOULDER PAIN: ICD-10-CM

## 2023-01-03 NOTE — PROGRESS NOTES
Daily Note     Today's date: 1/3/2023  Patient name: Redd Dsouza  : 1945  MRN: [id number]  Referring provider: Estuardo Fierro*  Dx:   Encounter Diagnosis     ICD-10-CM    1  Localized osteoarthritis of left shoulder  M19 012       2  Chronic left shoulder pain  M25 512     G89 29           Start Time: 1230  Stop Time: 1315  Total time in clinic (min): 45 minutes    Subjective: Pt reports that his shoulder is feeling a little stiff this morning, believes due to the weather, but overall feels good  Pt denies any pain prior to the start of his treatment session  Feels better after he uses his arm and moves it throughout the day  Objective: See treatment diary below      Assessment: Tolerated treatment well  Patient demonstrated fatigue post treatment, exhibited good technique with therapeutic exercises and would benefit from continued PT      Plan: Continue per plan of care  Progress treatment as tolerated  Eval/ Re-eval Auth #/ Referral # Total visits Start date  Expiration date Total active units  Total manual units  PT only or  PT+OT?      IE                                    Precautions:  Significant LBP and history, 2 stents placed in , Hypertension,  Left TSA 22,      TSA protocol    Specialty Daily Treatment Diary       Manuals 1/3/22 12/29 12/27 12/22 12/20   Visit # [phone]   STM UT, pec group        PROM shld  All directions  All directions All directions   ST joint mobs        scap mobilizations        GH mobilizaitons        Warm-up                Neuro Re-Ed        Posture correct        Supine flex into ABC Standing ABC 1lb 1x  2lbs ABC       rows        Scap squeeze     Slouch correct 15x5s   Standing flexion/abd to 90  1# scaption/abd  2x10   Prior to scap hiking and cues for scap retraction 2x10 ea   Isomets 4 way Standing ER/IR 1 0 2x10 ea Standing ER/IR 1 0 2x10 ea Standing ER/IR 1 0 2x10 ea     Wall ball circles        Pulleys 2 min flex/abd 2 min flex/abd  2 min flex  abd 2 mins flex/abd   Supine D2        bodyblade  Neutral 3x30s  PNF D2 3x30s  Neutral 2x30s  90 flex H+V 30s ea  abd & ER 30s    Dynamic stabilization in UE supported ER        Sl GH flexion  Standing Uni shld flex YTB 2x10   2x10 cues for scap retr   Standing D2 2x10 at wall       SL ER     3x10  Cues for scap retr   Ther Ex        No Money  YTB 2x10 5s  2x10 5s 2x10 5s           Upper trap str        C/s rot str        Supine cane flex        Pendulums        Wand flex        Wand ER        Wand abd        Elbow sup/pron        Elbow flex / ext        Supine AA cane press-up        Wall slides        Standing AROM flex & abd        max biceps curls    Max 5 0 3 way    max triceps ext        pec flys max 2 0 3x10       Reverse pec flys max 2 0 3x10       IR standing SOS str 15x10s  15x5s     Functional ER into IR 10x   [place] w/ rotation 2 0 20x    Shoulder flexion cone/weight placement on shelf        Flexion into horiz abd        Standing abd into ER        Reverse flys        pec flys        Standing B ER        Table slides        Ther Activity                                Modalities        CP                     Access Code: [email or web address]  URL: [email or web address]  Date: 09/22/2022  Prepared by:  Devendra Sepulveda

## 2023-01-04 ENCOUNTER — OFFICE VISIT (OUTPATIENT)
Dept: OBGYN CLINIC | Facility: CLINIC | Age: 78
End: 2023-01-04

## 2023-01-04 VITALS
BODY MASS INDEX: 25.9 KG/M2 | DIASTOLIC BLOOD PRESSURE: 69 MMHG | HEIGHT: 67 IN | SYSTOLIC BLOOD PRESSURE: 135 MMHG | HEART RATE: 58 BPM | WEIGHT: 165 LBS

## 2023-01-04 DIAGNOSIS — Z96.612 STATUS POST TOTAL REPLACEMENT OF LEFT SHOULDER: Primary | ICD-10-CM

## 2023-01-04 NOTE — PROGRESS NOTES
Assessment/Plan:  1  Status post total replacement of left shoulder          Scribe Attestation    I,:  Kimberly Sotomayor am acting as a scribe while in the presence of the attending physician :       I,:  Robin Gupta MD personally performed the services described in this documentation    as scribed in my presence :           Gisselle Lincoln upon exam demonstrates improvements in strength and range of motion since his previous visit 6 weeks ago  Upon review of his physically therapy notes and my examination today I am pleased with his continued progress with range of motion and strength  We did discuss recovery from a total shoulder arthroplasty can take a long time  Patient demonstrated increased range of motion with internal rotation today  He also demonstrated increased strength in both internal and external rotation and abduction  I discussed with the patient that these gradual improvements are good progress  I believe his pain will continue to resolve as he improves his strength and range of motion  Continue physical therapy, and home exercise program   Continue activities of daily living as tolerated  Would like to see him 1 more time in 6 weeks for repeat evaluation  I would like to see Soraya Verdin 1 more time, in 6 weeks for repeat evaluation  Soraya Verdin verbalized understanding and was amenable to this plan  Subjective:   Duane Crespo is a 76 y o  male who presents for evaluation of his left shoulder  Patient is approximately 4 months status post left shoulder standard total arthroplasty and biceps tenodesis  Patient states he has noticed improvement since his last visit 6 weeks ago, however he notes continued significant pain  He indicates deltoid muscle, bicep tendon, and sternocleidomastoid as the primary locations of his pain  He states his neck pain is the most significant and problematic at this time  He notes his pain can radiate all the way to his hand    He does feel his motion has improved with physical therapy and overall has seen improvements but is frustrated with his pain and discomfort  Review of Systems   Constitutional: Negative for chills and fever  HENT: Negative for ear pain and sore throat  Eyes: Negative for pain and visual disturbance  Respiratory: Negative for cough and shortness of breath  Cardiovascular: Negative for chest pain and palpitations  Gastrointestinal: Negative for abdominal pain and vomiting  Genitourinary: Negative for dysuria and hematuria  Musculoskeletal: Negative for arthralgias and back pain  Skin: Negative for color change and rash  Neurological: Negative for seizures and syncope  All other systems reviewed and are negative          Past Medical History:   Diagnosis Date   • Coronary artery disease    • CPAP (continuous positive airway pressure) dependence    • Hypercholesterolemia    • Hypertension    • Sleep apnea        Past Surgical History:   Procedure Laterality Date   • BACK SURGERY     • CARDIAC LOOP RECORDER     • CORONARY STENT PLACEMENT      x 2   • MO ARTHROPLASTY GLENOHUMERAL JOINT TOTAL SHOULDER Left 8/18/2022    Procedure: LEFT SHOULDER STANDARD TOTAL SHOULDER ARTHROPLASTY AND BICEPS TENODESIS;  Surgeon: Cecilia Merrill MD;  Location: [facility];  Service: Orthopedics   • SPINAL FUSION      L2, L3 and L4   • SPINAL STIMULATOR PLACEMENT  09/2021       Family History   Problem Relation Age of Onset   • No Known Problems Mother        Social History     Occupational History   • Not on file   Tobacco Use   • Smoking status: Former     Types: Cigarettes   • Smokeless tobacco: Never   Substance and Sexual Activity   • Alcohol use: Not Currently   • Drug use: Yes     Types: Marijuana     Comment: Medical Marijuana   • Sexual activity: Not on file         Current Outpatient Medications:   •  amLODIPine (NORVASC) 10 mg tablet, , Disp: , Rfl:   •  aspirin (ECOTRIN LOW STRENGTH) 81 mg EC tablet, Take 81 mg by mouth daily Last dose 8/11/22, Disp: , Rfl:   •  atorvastatin (LIPITOR) 40 mg tablet, daily at bedtime, Disp: , Rfl:   •  latanoprost (XALATAN) 0 005 % ophthalmic solution, , Disp: , Rfl:   •  mesalamine (CANASA) 1,000 mg suppository, , Disp: , Rfl:   •  mesalamine (ROWASA) 4 g, , Disp: , Rfl:   •  metoprolol succinate (TOPROL-XL) 25 mg 24 hr tablet, daily at bedtime Takes  1/2 tab, Disp: , Rfl:   •  oxybutynin (DITROPAN-XL) 10 MG 24 hr tablet, 10 mg  in the morning and 10 mg before bedtime  , Disp: , Rfl:   •  pantoprazole (PROTONIX) 40 mg tablet, , Disp: , Rfl:     No Known Allergies    Objective:  Vitals:    01/04/23 1008   BP: 135/69   Pulse: 58       Left Shoulder Exam     Range of Motion   Active abduction: 90   Internal rotation 0 degrees: L4     Muscle Strength   Internal rotation: 5/5   External rotation: 5/5     Other   Erythema: absent  Scars: present  Sensation: normal  Pulse: present     Comments:  4+/5 abduction    Well healed anterior scar             Physical Exam  Vitals and nursing note reviewed  HENT:      Head: Normocephalic  Eyes:      Extraocular Movements: Extraocular movements intact  Cardiovascular:      Rate and Rhythm: Normal rate  Pulses: Normal pulses  Pulmonary:      Effort: Pulmonary effort is normal    Musculoskeletal:         General: Normal range of motion  Cervical back: Normal range of motion  Comments: See HPI   Skin:     General: Skin is warm and dry  Neurological:      General: No focal deficit present  Mental Status: He is alert  Psychiatric:         Behavior: Behavior normal          I have personally reviewed pertinent films in PACS and my interpretation is as follows: No imaging reviewed today  This document was created using speech voice recognition software     Grammatical errors, random word insertions, pronoun errors, and incomplete sentences are an occasional consequence of this system due to software limitations, ambient noise, and hardware issues  Any formal questions or concerns about content, text, or information contained within the body of this dictation should be directly addressed to the provider for clarification

## 2023-01-05 ENCOUNTER — OFFICE VISIT (OUTPATIENT)
Dept: PHYSICAL THERAPY | Facility: CLINIC | Age: 78
End: 2023-01-05

## 2023-01-05 DIAGNOSIS — G89.29 CHRONIC LEFT SHOULDER PAIN: ICD-10-CM

## 2023-01-05 DIAGNOSIS — M19.012 LOCALIZED OSTEOARTHRITIS OF LEFT SHOULDER: Primary | ICD-10-CM

## 2023-01-05 DIAGNOSIS — M25.512 CHRONIC LEFT SHOULDER PAIN: ICD-10-CM

## 2023-01-05 NOTE — PROGRESS NOTES
Daily Note     Today's date: 2023  Patient name: Rebeca Hoffman  : 1945  MRN: [id number]  Referring provider: Gracie Smith*  Dx:   Encounter Diagnosis     ICD-10-CM    1  Localized osteoarthritis of left shoulder  M19 012       2  Chronic left shoulder pain  M25 512     G89 29           Start Time: 0800  Stop Time: 0845  Total time in clinic (min): 45 minutes    Subjective: Pt reports he saw his MD yesterday and he was pleased with his progress, pt states he will continue with HEP on  and make that his last day  Objective: See treatment diary below      Assessment: Tolerated treatment well  Patient demonstrated fatigue post treatment, exhibited good technique with therapeutic exercises and would benefit from continued PT  Pt does well with strength exercises, ROM does limit him with certain exercises, no pain post       Plan: Continue per plan of care  Progress treatment as tolerated  Eval/ Re-eval Auth #/ Referral # Total visits Start date  Expiration date Total active units  Total manual units  PT only or  PT+OT?      IE                                    Precautions:  Significant LBP and history, 2 stents placed in , Hypertension,  Left TSA 22,      TSA protocol    Specialty Daily Treatment Diary       Manuals 1/5/22 1/3/22 12/29 12/27 12/22   Visit # [phone]   STM UT, pec group        PROM shld   All directions  All directions   ST joint mobs        scap mobilizations        GH mobilizaitons        Warm-up                Neuro Re-Ed        Posture correct        Supine flex into ABC Standing ABC 2# 2x Standing ABC 1lb 1x  2lbs ABC      rows Bent over at table 2x10       Scap squeeze        Standing flexion/abd to 90   1# scaption/abd  2x10     Isomets 4 way Max IR 3 0  ER 1 0 20x ea Standing ER/IR 1 0 2x10 ea Standing ER/IR 1 0 2x10 ea Standing ER/IR 1 0 2x10 ea    Wall ball circles        Pulleys 2 min ea flex/abd 2 min flex/abd 2 min flex/abd  2 min flex  abd   Supine D2        bodyblade   Neutral 3x30s  PNF D2 3x30s  Neutral 2x30s  90 flex H+V 30s ea  abd & ER 30s   Dynamic stabilization in UE supported ER        Sl GH flexion   Standing Uni shld flex YTB 2x10     Standing D2 2x10 at wall 2x10 at wall      SL ER        Ther Ex        No Money   YTB 2x10 5s  2x10 5s           Upper trap str        C/s rot str        Supine cane flex        Pendulums        Wand flex        Wand ER        Wand abd        Elbow sup/pron        Elbow flex / ext        Supine AA cane press-up        Wall slides        Standing AROM flex & abd        max biceps curls 10# 2x10    Max 5 0 3 way   amx triceps ext        pec flys [place] 2 0 3x10 max 2 0 3x10      Reverse pec flys  max 2 0 3x10      IR standing SOS str 10x10s 15x10s  15x5s    Functional ER into IR 10x 10x   [place] w/ rotation 2 0 20x   Shoulder flexion cone/weight placement on shelf        Flexion into horiz abd        Standing abd into ER        Reverse flys        pec flys        Standing B ER        Table slides        Ther Activity                                Modalities        CP                     Access Code: [email or web address]  URL: [email or web address]  Date: 09/22/2022  Prepared by:  Maura Galvan

## 2023-01-10 ENCOUNTER — OFFICE VISIT (OUTPATIENT)
Dept: PHYSICAL THERAPY | Facility: CLINIC | Age: 78
End: 2023-01-10

## 2023-01-10 DIAGNOSIS — G89.29 CHRONIC LEFT SHOULDER PAIN: ICD-10-CM

## 2023-01-10 DIAGNOSIS — M19.012 LOCALIZED OSTEOARTHRITIS OF LEFT SHOULDER: Primary | ICD-10-CM

## 2023-01-10 DIAGNOSIS — M25.512 CHRONIC LEFT SHOULDER PAIN: ICD-10-CM

## 2023-01-10 NOTE — PROGRESS NOTES
Daily Note     Today's date: 1/10/2023  Patient name: Anabel Rodriguez  : 1945  MRN: [id number]  Referring provider: Irvin Flores*  Dx:   Encounter Diagnosis     ICD-10-CM    1  Localized osteoarthritis of left shoulder  M19 012       2  Chronic left shoulder pain  M25 512     G89 29                      Subjective: Patient reports he consistently works on his exercises at home, he continues to have significant challenge putting on a jacket at home  Objective: See treatment diary below      Assessment: Tolerated treatment well  Patient requires verbal cueing to maintain straight elbow with overhead shoulder activities  Shoulder ER improved, though quickly fatigues with higher repetitions of targeted ER with shoulder at 0 degrees of flexion  Increased challenge with shoulder flexion motion with YTB resistance, significant challenge from 60-90 degrees of flexion motion  Patient demonstrated fatigue post treatment, exhibited good technique with therapeutic exercises and would benefit from continued PT      Plan: Continue per plan of care  Progress treatment as tolerated  Progress challenge as appropriate with irritability  Eval/ Re-eval Auth #/ Referral # Total visits Start date  Expiration date Total active units  Total manual units  PT only or  PT+OT?      IE                                    Precautions:  Significant LBP and history, 2 stents placed in , Hypertension,  Left TSA 22,      TSA protocol    Specialty Daily Treatment Diary       Manuals 1/10/23 1/5/22 1/3/22 12/29 12/27   Visit # [phone]   STM UT, pec group        PROM shld    All directions    ST joint mobs        scap mobilizations        GH mobilizaitons        Warm-up        MH        Neuro Re-Ed        Posture correct        Supine flex into ABC Standing ABC 2# 2x Standing ABC 2# 2x Standing ABC 1lb 1x  2lbs ABC     rows Bent Over Table 2x10 Bent over at table 2x10      Scap squeeze Standing flexion/abd to 90    1# scaption/abd  2x10    Isomets 4 way Standing ER 1 0 20x, Standing IR 3 0 20x [place] IR 3 0  ER 1 0 20x ea Standing ER/IR 1 0 2x10 ea Standing ER/IR 1 0 2x10 ea Standing ER/IR 1 0 2x10 ea   Wall ball circles        Pulleys 2 min ea flex/abd 2 min ea flex/abd 2 min flex/abd 2 min flex/abd    Supine D2        bodyblade    Neutral 3x30s  PNF D2 3x30s    Dynamic stabilization in UE supported ER        Sl GH flexion    Standing Uni shld flex YTB 2x10    Standing D2 2x10 at wall 2x10 at wall 2x10 at wall     SL ER        Ther Ex        No Money    YTB 2x10 5s    Standing GH Flexion YTB 2x10       Upper trap str        C/s rot str        Supine cane flex        Pendulums        Wand flex        Wand ER        Wand abd        Elbow sup/pron        Elbow flex / ext        Supine AA cane press-up        Wall slides        Standing AROM flex & abd        max biceps curls  10# 2x10      max triceps ext        pec flys Max 2 0 3x10 Max 2 0 3x10 max 2 0 3x10     Reverse pec flys [place] 2 0 3x10  max 2 0 3x10     IR standing SOS str 10x10" 10x10s 15x10s  15x5s   Functional ER into IR 10x 10x 10x     Shoulder flexion cone/weight placement on shelf        Flexion into horiz abd        Standing abd into ER        Reverse flys        pec flys        Standing B ER        Table slides        Ther Activity                                Modalities        CP                     Access Code: [email or web address]  URL: [email or web address]  Date: 09/22/2022  Prepared by:  Keyana Prajapati

## 2023-01-12 ENCOUNTER — OFFICE VISIT (OUTPATIENT)
Dept: PHYSICAL THERAPY | Facility: CLINIC | Age: 78
End: 2023-01-12

## 2023-01-12 DIAGNOSIS — M25.512 CHRONIC LEFT SHOULDER PAIN: ICD-10-CM

## 2023-01-12 DIAGNOSIS — G89.29 CHRONIC LEFT SHOULDER PAIN: ICD-10-CM

## 2023-01-12 DIAGNOSIS — M19.012 LOCALIZED OSTEOARTHRITIS OF LEFT SHOULDER: Primary | ICD-10-CM

## 2023-01-12 NOTE — PROGRESS NOTES
Daily Note     Today's date: 2023  Patient name: Sherrill Hanson  : 1945  MRN: [id number]  Referring provider: Eric Renee*  Dx:   Encounter Diagnosis     ICD-10-CM    1  Localized osteoarthritis of left shoulder  M19 012       2  Chronic left shoulder pain  M25 512     G89 29           Start Time: 0845  Stop Time: 930  Total time in clinic (min): 45 minutes    Subjective: Pt states that he was feeling really good over the last week with a lot less pain, however the past 2 nights it has been hurting a lot  Pt plans on discharging after his next treatment session  Pt feels the pain is slowly decreasing since the recent irritation  Objective: See treatment diary below      Assessment: Tolerated treatment well  Patient demonstrated fatigue post treatment, exhibited good technique with therapeutic exercises and would benefit from continued PT  Pt continues to be restricted with his ROM and weakness with ER/IR of the L shoulder, however continues to do well after being given minimal cues for RTC recruitment  Pt is discharging at his next visit, will update HEP  Plan: Continue per plan of care  Potential discharge next visit  Progress treatment as tolerated  Eval/ Re-eval Auth #/ Referral # Total visits Start date  Expiration date Total active units  Total manual units  PT only or  PT+OT?      IE                                    Precautions:  Significant LBP and history, 2 stents placed in , Hypertension,  Left TSA 22,      TSA protocol    Specialty Daily Treatment Diary       Manuals 1/12 1/10/23 1/5/22 1/3/22   Visit # [phone]   STM UT, pec group       PROM shld       ST joint mobs       scap mobilizations       GH mobilizaitons       Warm-up       MH       Neuro Re-Ed       Posture correct       Supine flex into ABC Standing ABC 2# 2x Standing ABC 2# 2x Standing ABC 2# 2x Standing ABC 1lb 1x  2lbs ABC   rows Bent over table 2x10 10lbs Bent Over Table 2x10 Bent over at table 2x10    Scap squeeze       Standing flexion/abd to 90       Isomets 4 way Standing ER 1 0 20x, Standing IR 2 5 20x Standing ER 1 0 20x, Standing IR 3 0 20x Max IR 3 0  ER 1 0 20x ea Standing ER/IR 1 0 2x10 ea   Wall ball circles       Pulleys  2 min ea flex/abd 2 min ea flex/abd 2 min flex/abd   Supine D2       bodyblade       Dynamic stabilization in UE supported ER       Sl GH flexion       Standing D2 3x10 at wall 2x10 at wall 2x10 at wall 2x10 at wall   SL ER       Ther Ex       No Money       Standing GH Flexion  YTB 2x10     Upper trap str       C/s rot str       Supine cane flex       Pendulums       Wand flex       Wand ER       Wand abd       Elbow sup/pron       Elbow flex / ext       Supine AA cane press-up       Wall slides       Standing AROM flex & abd       max biceps curls 10# 3x10  10# 2x10    max triceps ext       pec flys  [place] 2 0 3x10 [place] 2 0 3x10 max 2 0 3x10   Reverse pec flys  Max 2 0 3x10  max 2 0 3x10   IR standing SOS str  10x10" 10x10s 15x10s   Functional ER into IR  10x 10x 10x   Shoulder flexion cone/weight placement on shelf       Flexion into horiz abd       Standing abd into ER       Reverse flys       pec flys       Standing B ER       Table slides       Ther Activity                            Modalities       CP                   Access Code: [email or web address]  URL: [email or web address]  Date: 09/22/2022  Prepared by:  Clinton Kim

## 2023-01-17 ENCOUNTER — OFFICE VISIT (OUTPATIENT)
Dept: PHYSICAL THERAPY | Facility: CLINIC | Age: 78
End: 2023-01-17

## 2023-01-17 DIAGNOSIS — M25.512 CHRONIC LEFT SHOULDER PAIN: ICD-10-CM

## 2023-01-17 DIAGNOSIS — G89.29 CHRONIC LEFT SHOULDER PAIN: ICD-10-CM

## 2023-01-17 DIAGNOSIS — M19.012 LOCALIZED OSTEOARTHRITIS OF LEFT SHOULDER: Primary | ICD-10-CM

## 2023-01-17 NOTE — PROGRESS NOTES
Discharge     Today's date: 2023  Patient name: Nevin Haji  : 1945  MRN: [id number]  Referring provider: Lynn Beltre*  Dx:   Encounter Diagnosis     ICD-10-CM    1  Localized osteoarthritis of left shoulder  M19 012       2  Chronic left shoulder pain  M25 512     G89 29           Start Time: 930  Stop Time: 1015  Total time in clinic (min): 45 minutes    Subjective: Pt reports that his shoulder is feeling good over the last few days  Pt states today is his last session in PT and wants to go over his HEP, specifically using the bands for ER/IR  Pt still feels limited with his motion but hopes to get more as he continues at home  Objective: See treatment diary below      Assessment: Tolerated treatment well  Patient demonstrated fatigue post treatment and exhibited good technique with therapeutic exercises  Pt demonstrated gradual progress since starting PT at this facility s/p L total shoulder replacement  Over the last two months, pt has seen less progress and has hit a plateau with his motion  Pt continues working on strengthening his shoulder and has seen a reduction in his pain at rest and with activity  Pt is going to continue working on his shoulder at home, pt was given an updated HEP and instructed on how to continue outside of PT due to the lack of progress in the clinic  Plan: Discharge to updated HEP  Eval/ Re-eval Auth #/ Referral # Total visits Start date  Expiration date Total active units  Total manual units  PT only or  PT+OT?      IE                                    Precautions:  Significant LBP and history, 2 stents placed in , Hypertension,  Left TSA 22,      TSA protocol    Specialty Daily Treatment Diary       Manuals 1/17 1/12 1/10/23 1/5/22 1/3/22   Visit # [phone] 35   STM UT, pec group        PROM shld        ST joint mobs        scap mobilizations        GH mobilizaitons        Warm-up        MH        Neuro Re-Ed Posture correct        Supine flex into ABC  Standing ABC 2# 2x Standing ABC 2# 2x Standing ABC 2# 2x Standing ABC 1lb 1x  2lbs ABC   rows  Bent over table 2x10 10lbs Bent Over Table 2x10 Bent over at table 2x10    Scap squeeze        Standing flexion/abd to 90        Isomets 4 way  Standing ER 1 0 20x, Standing IR 2 5 20x Standing ER 1 0 20x, Standing IR 3 0 20x [place] IR 3 0  ER 1 0 20x ea Standing ER/IR 1 0 2x10 ea   Wall ball circles        Pulleys   2 min ea flex/abd 2 min ea flex/abd 2 min flex/abd   Supine D2        bodyblade        Dynamic stabilization in UE supported ER        Sl GH flexion        Standing D2  3x10 at wall 2x10 at wall 2x10 at wall 2x10 at wall   SL ER        Ther Ex        No Money        Standing GH Flexion   YTB 2x10     Upper trap str        C/s rot str        Supine cane flex        Pendulums        Wand flex        Wand ER        Wand abd        Elbow sup/pron        Elbow flex / ext        Supine AA cane press-up        Wall slides        Standing AROM flex & abd        max biceps curls  10# 3x10  10# 2x10    max triceps ext        pec flys   Max 2 0 3x10 Max 2 0 3x10 max 2 0 3x10   Reverse pec flys   [place] 2 0 3x10  max 2 0 3x10   IR standing SOS str   10x10" 10x10s 15x10s   Functional ER into IR   10x 10x 10x   Shoulder flexion cone/weight placement on shelf        Flexion into horiz abd        Standing abd into ER        Reverse flys        pec flys        Standing B ER        Table slides        Ther Activity                                Modalities        CP                     Access Code [email or web address]  URL: [email or web address]  Date: 09/22/2022  Prepared by:  Lyudmila Lewis

## 2023-02-15 ENCOUNTER — OFFICE VISIT (OUTPATIENT)
Dept: OBGYN CLINIC | Facility: CLINIC | Age: 78
End: 2023-02-15

## 2023-02-15 VITALS
DIASTOLIC BLOOD PRESSURE: 66 MMHG | SYSTOLIC BLOOD PRESSURE: 135 MMHG | WEIGHT: 168.2 LBS | BODY MASS INDEX: 26.4 KG/M2 | HEART RATE: 61 BPM | HEIGHT: 67 IN

## 2023-02-15 DIAGNOSIS — Z96.612 STATUS POST TOTAL REPLACEMENT OF LEFT SHOULDER: Primary | ICD-10-CM

## 2023-02-15 RX ORDER — PREDNISOLONE ACETATE 10 MG/ML
SUSPENSION/ DROPS OPHTHALMIC
COMMUNITY
Start: 2023-01-04

## 2023-02-15 NOTE — PROGRESS NOTES
Assessment/Plan:  1  Status post total replacement of left shoulder          Scribe Attestation    I,:  Patty Quiroga am acting as a scribe while in the presence of the attending physician :       I,:  Iker Silverman MD personally performed the services described in this documentation    as scribed in my presence :             Watson Patel on examination and review of the physical therapy notes is doing well now 6 months status post left total shoulder arthroplasty  He demonstrates functional improvements with range of motion and strength in all planes on exam today  I did explain that a full recovery is typically 12 months  He is doing quite well at this point in his recovery  I encouraged him to continue his home exercises from physical therapy and may continue with activities of daily living as tolerated  I do believe that the pain that he experiences intermittently will continue to improve as he improves his range of motion and strength  Watson Patel verbalized understanding was amenable to treatment plan presented to them today  I will see him back on an as-needed basis  Subjective:   Zachary Drake is a 76 y o  male who presents for follow-up evaluation of his left shoulder  He is 6 months status post standard total shoulder arthroplasty  He states that he is doing well overall, but still experiences intermittent pain that can be sharp at times  He has been doing to the home exercise program from physical therapy with great diligence  He notes that normal activities of daily living are relatively pain free, but can experiencing pain with a sudden reaching motion  Today he denies any distal paresthesias  Review of Systems   Constitutional: Negative for chills, fever and unexpected weight change  HENT: Negative for hearing loss, nosebleeds and sore throat  Eyes: Negative for pain, redness and visual disturbance  Respiratory: Negative for cough, shortness of breath and wheezing  Cardiovascular: Negative for chest pain, palpitations and leg swelling  Gastrointestinal: Negative for abdominal pain, nausea and vomiting  Endocrine: Negative for polyphagia and polyuria  Genitourinary: Negative for dysuria and hematuria  Musculoskeletal: Positive for arthralgias and myalgias  See HPI   Skin: Negative for rash and wound  Neurological: Negative for dizziness, numbness and headaches  Psychiatric/Behavioral: Negative for decreased concentration and suicidal ideas  The patient is not nervous/anxious            Past Medical History:   Diagnosis Date   • Coronary artery disease    • CPAP (continuous positive airway pressure) dependence    • Hypercholesterolemia    • Hypertension    • Sleep apnea        Past Surgical History:   Procedure Laterality Date   • BACK SURGERY     • CARDIAC LOOP RECORDER     • CORONARY STENT PLACEMENT      x 2   • LA ARTHROPLASTY GLENOHUMERAL JOINT TOTAL SHOULDER Left 8/18/2022    Procedure: LEFT SHOULDER STANDARD TOTAL SHOULDER ARTHROPLASTY AND BICEPS TENODESIS;  Surgeon: Lyudmila Babcock MD;  Location: [facility];  Service: Orthopedics   • SPINAL FUSION      L2, L3 and L4   • SPINAL STIMULATOR PLACEMENT  09/2021       Family History   Problem Relation Age of Onset   • No Known Problems Mother        Social History     Occupational History   • Not on file   Tobacco Use   • Smoking status: Former     Types: Cigarettes   • Smokeless tobacco: Never   Vaping Use   • Vaping Use: Never used   Substance and Sexual Activity   • Alcohol use: Not Currently   • Drug use: Yes     Types: Marijuana     Comment: Medical Marijuana   • Sexual activity: Not on file         Current Outpatient Medications:   •  amLODIPine (NORVASC) 10 mg tablet, , Disp: , Rfl:   •  aspirin (ECOTRIN LOW STRENGTH) 81 mg EC tablet, Take 81 mg by mouth daily Last dose 8/11/22, Disp: , Rfl:   •  atorvastatin (LIPITOR) 40 mg tablet, daily at bedtime, Disp: , Rfl:   •  latanoprost (Lenn Remington) 0 005 % ophthalmic solution, , Disp: , Rfl:   •  mesalamine (CANASA) 1,000 mg suppository, , Disp: , Rfl:   •  mesalamine (ROWASA) 4 g, , Disp: , Rfl:   •  metoprolol succinate (TOPROL-XL) 25 mg 24 hr tablet, daily at bedtime Takes  1/2 tab, Disp: , Rfl:   •  oxybutynin (DITROPAN-XL) 10 MG 24 hr tablet, 10 mg  in the morning and 10 mg before bedtime  , Disp: , Rfl:   •  pantoprazole (PROTONIX) 40 mg tablet, , Disp: , Rfl:   •  prednisoLONE acetate (PRED FORTE) 1 % ophthalmic suspension, PLEASE SEE ATTACHED FOR DETAILED DIRECTIONS, Disp: , Rfl:     No Known Allergies    Objective:  Vitals:    02/15/23 0856   BP: 135/66   Pulse: 61       Left Shoulder Exam     Range of Motion   Active abduction: 140   External rotation: 60   Internal rotation 0 degrees: L5     Muscle Strength   Left shoulder normal muscle strength: abduction: 4+/5  Internal rotation: 5/5   External rotation: 5/5     Other   Erythema: absent  Scars: present  Sensation: normal  Pulse: present             Physical Exam  Vitals reviewed  Constitutional:       Appearance: He is well-developed  HENT:      Head: Normocephalic and atraumatic  Eyes:      General:         Right eye: No discharge  Left eye: No discharge  Conjunctiva/sclera: Conjunctivae normal    Cardiovascular:      Rate and Rhythm: Normal rate  Pulmonary:      Effort: Pulmonary effort is normal  No respiratory distress  Musculoskeletal:      Cervical back: Normal range of motion and neck supple  Skin:     General: Skin is warm and dry  Neurological:      Mental Status: He is alert and oriented to person, place, and time  Psychiatric:         Behavior: Behavior normal          Thought Content: Thought content normal          Judgment: Judgment normal          I have personally reviewed pertinent films in PACS and my interpretation is as follows: No images reviewed today      This document was created using speech voice recognition software     Grammatical errors, random word insertions, pronoun errors, and incomplete sentences are an occasional consequence of this system due to software limitations, ambient noise, and hardware issues  Any formal questions or concerns about content, text, or information contained within the body of this dictation should be directly addressed to the provider for clarification

## (undated) DEVICE — DUAL CUT SAGITTAL BLADE

## (undated) DEVICE — REPEL LITE CUT REST SURGICAL GLV LNRS X-LG: Brand: REPEL

## (undated) DEVICE — POSITIONER TRIMANO LIMB BEACH CHAIR

## (undated) DEVICE — DRAPE SHEET X-LG

## (undated) DEVICE — DRESSING MEPILEX AG BORDER 4 X 8 IN

## (undated) DEVICE — HANDPIECE SET WITH HIGH FLOW TIP AND SUCTION TUBE: Brand: INTERPULSE

## (undated) DEVICE — INTENDED FOR TISSUE SEPARATION, AND OTHER PROCEDURES THAT REQUIRE A SHARP SURGICAL BLADE TO PUNCTURE OR CUT.: Brand: BARD-PARKER SAFETY BLADES SIZE 15, STERILE

## (undated) DEVICE — 3M™ IOBAN™ 2 ANTIMICROBIAL INCISE DRAPE 6640EZ: Brand: IOBAN™ 2

## (undated) DEVICE — GLOVE SRG BIOGEL 8

## (undated) DEVICE — GLOVE INDICATOR PI UNDERGLOVE SZ 7.5 BLUE

## (undated) DEVICE — TIBURON SPLIT SHEET: Brand: CONVERTORS

## (undated) DEVICE — CAPIT SHOULDER TOTAL UNITE

## (undated) DEVICE — VEST SURGEON DISPOSABLE

## (undated) DEVICE — BASIC DOUBLE BASIN 2-LF: Brand: MEDLINE INDUSTRIES, INC.

## (undated) DEVICE — CHLORAPREP HI-LITE 26ML ORANGE

## (undated) DEVICE — DELTA XTEND METAGLENE CENTRAL GUIDE PIN DIAMETER 2,5 X 190 MM: Brand: DELTA XTEND

## (undated) DEVICE — ORTHOPEDIC PACK: Brand: CARDINAL HEALTH

## (undated) DEVICE — SUT VICRYL 4-0 PS-2 18 IN J496G

## (undated) DEVICE — ASTOUND STANDARD SURGICAL GOWN, XL: Brand: CONVERTORS

## (undated) DEVICE — SUT ETHIBOND 5 V-37 30 IN MB66G

## (undated) DEVICE — DRAPE UTILITY

## (undated) DEVICE — ADHESIVE SKIN HIGH VISCOSITY EXOFIN 1ML

## (undated) DEVICE — HOOD: Brand: FLYTE, SURGICOOL

## (undated) DEVICE — ANTIBACTERIAL UNDYED BRAIDED (POLYGLACTIN 910), SYNTHETIC ABSORBABLE SUTURE: Brand: COATED VICRYL

## (undated) DEVICE — ANTIBACTERIAL VIOLET BRAIDED (POLYGLACTIN 910), SYNTHETIC ABSORBABLE SUTURE: Brand: COATED VICRYL